# Patient Record
Sex: FEMALE | Race: WHITE | Employment: STUDENT | URBAN - METROPOLITAN AREA
[De-identification: names, ages, dates, MRNs, and addresses within clinical notes are randomized per-mention and may not be internally consistent; named-entity substitution may affect disease eponyms.]

---

## 2018-02-10 ENCOUNTER — OFFICE VISIT (OUTPATIENT)
Dept: FAMILY MEDICINE CLINIC | Facility: CLINIC | Age: 23
End: 2018-02-10
Payer: COMMERCIAL

## 2018-02-10 VITALS
HEIGHT: 61 IN | BODY MASS INDEX: 25.49 KG/M2 | DIASTOLIC BLOOD PRESSURE: 70 MMHG | SYSTOLIC BLOOD PRESSURE: 110 MMHG | WEIGHT: 135 LBS | TEMPERATURE: 99.4 F

## 2018-02-10 DIAGNOSIS — J11.1 INFLUENZA: Primary | ICD-10-CM

## 2018-02-10 PROCEDURE — 99214 OFFICE O/P EST MOD 30 MIN: CPT | Performed by: FAMILY MEDICINE

## 2018-02-10 PROCEDURE — 3008F BODY MASS INDEX DOCD: CPT | Performed by: FAMILY MEDICINE

## 2018-02-10 RX ORDER — DEXTROMETHORPHAN HYDROBROMIDE AND PROMETHAZINE HYDROCHLORIDE 15; 6.25 MG/5ML; MG/5ML
5 SYRUP ORAL 4 TIMES DAILY PRN
Qty: 118 ML | Refills: 0 | Status: SHIPPED | OUTPATIENT
Start: 2018-02-10 | End: 2019-12-02

## 2018-02-10 RX ORDER — CLINDAMYCIN PHOSPHATE AND BENZOYL PEROXIDE 10; 50 MG/G; MG/G
GEL TOPICAL
Refills: 0 | COMMUNITY
Start: 2018-01-18 | End: 2019-06-11

## 2018-02-10 RX ORDER — OSELTAMIVIR PHOSPHATE 75 MG/1
75 CAPSULE ORAL EVERY 12 HOURS SCHEDULED
Qty: 10 CAPSULE | Refills: 0 | Status: SHIPPED | OUTPATIENT
Start: 2018-02-10 | End: 2018-02-15

## 2018-02-10 NOTE — PROGRESS NOTES
Assessment/Plan:  Influenza    The patient is a 25-year-old female who presents today with a respiratory illness which includes sore throat cough headache myalgias and low-grade fever  I believe this most likely represents influenza  I do not believe it represents a strep throat but we are going to get a culture to rule this out  We are going to start her on Tamiflu as well as promethazine DM and have her push fluids and rest   She will stay out of work and school until Monday  She will call Monday for a report and for results of her throat culture  She will call sooner or seek more urgent medical attention as needed  She agrees  Diagnoses and all orders for this visit:    Influenza  -     oseltamivir (TAMIFLU) 75 mg capsule; Take 1 capsule (75 mg total) by mouth every 12 (twelve) hours for 5 days  -     promethazine-dextromethorphan (PHENERGAN-DM) 6 25-15 mg/5 mL oral syrup; Take 5 mL by mouth 4 (four) times a day as needed for cough  -     Throat culture    Other orders  -     Clindamycin Phos-Benzoyl Perox gel; APPLY TO ACNE ON WHOLE FACE AND SHOULDERS ONCE DAILY AT BEDTIME  KEEP REFRIGERATED IN BETWEEN USE  Subjective:   Chief Complaint   Patient presents with    Sore Throat     X 4 Days    Cough   I have a bad ST  No adenopathy  I feel achy and fatigued  Began 3 days ago  HA without N/V/D  No rash, irritative urinary sx  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  No fever  Has a bad cough  Sputum is clear, no hemoptysis  Patient ID: Mark Ontiveros is a 25 y o  female  HPI  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 who states that on Wednesday evening she began with a sore throat and a cough  She felt achy and fatigued  She has a headache as well  She has had no nausea vomiting or diarrhea  She has had no rash no irritative urinary symptoms and she has been aware of no fever  Sputum is clear without hemoptysis or purulence  She is not aware of any adenopathy    She does work around children and attend school on a regular basis  She has had no influenza vaccine this year  The following portions of the patient's history were reviewed and updated as appropriate: allergies, current medications, past family history, past medical history, past social history, past surgical history and problem list     Review of Systems   Constitution: Positive for chills and malaise/fatigue  Negative for decreased appetite, fever and night sweats  HENT: Positive for congestion and sore throat  Cardiovascular: Negative for chest pain  Respiratory: Positive for cough and sputum production  Negative for hemoptysis, shortness of breath and wheezing  Skin: Negative for rash  Gastrointestinal: Negative for diarrhea, nausea and vomiting  Genitourinary: Negative for bladder incontinence, dysuria and frequency  Objective:    Physical Exam   Constitutional: She is oriented to person, place, and time  She appears well-developed and well-nourished  Somewhat fatigued-appearing   Eyes: Conjunctivae are normal  Pupils are equal, round, and reactive to light  Right eye exhibits no discharge  Left eye exhibits no discharge  Scleral icterus is present  Neck: No thyromegaly present  Cardiovascular: Normal rate, regular rhythm and normal heart sounds  Pulmonary/Chest: Effort normal and breath sounds normal  She has no wheezes  She has no rales  Musculoskeletal: She exhibits no edema  Lymphadenopathy:     She has no cervical adenopathy  Neurological: She is alert and oriented to person, place, and time  Skin: Skin is warm and dry  No erythema  Psychiatric: She has a normal mood and affect

## 2018-02-10 NOTE — ASSESSMENT & PLAN NOTE
The patient is a 52-year-old female who presents today with a respiratory illness which includes sore throat cough headache myalgias and low-grade fever  I believe this most likely represents influenza  I do not believe it represents a strep throat but we are going to get a culture to rule this out  We are going to start her on Tamiflu as well as promethazine DM and have her push fluids and rest   She will stay out of work and school until Monday  She will call Monday for a report and for results of her throat culture  She will call sooner or seek more urgent medical attention as needed  She agrees

## 2018-02-10 NOTE — PATIENT INSTRUCTIONS
Push fluids rest use Tamiflu and promethazine Dm  Use antipyretics as needed  Call Monday with report

## 2018-08-21 LAB
EXTERNAL CHLAMYDIA RESULT: NEGATIVE
N GONORRHOEA RRNA SPEC QL PROBE: NEGATIVE

## 2018-11-12 ENCOUNTER — HOSPITAL ENCOUNTER (EMERGENCY)
Facility: HOSPITAL | Age: 23
Discharge: HOME | End: 2018-11-12
Attending: EMERGENCY MEDICINE | Admitting: EMERGENCY MEDICINE
Payer: COMMERCIAL

## 2018-11-12 VITALS
DIASTOLIC BLOOD PRESSURE: 84 MMHG | SYSTOLIC BLOOD PRESSURE: 144 MMHG | HEART RATE: 101 BPM | RESPIRATION RATE: 16 BRPM | OXYGEN SATURATION: 100 % | TEMPERATURE: 98.5 F
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
## 2018-11-12 DIAGNOSIS — S61.052A CAT BITE OF LEFT THUMB, INITIAL ENCOUNTER: Primary | ICD-10-CM

## 2018-11-12 PROCEDURE — 99283 EMERGENCY DEPT VISIT LOW MDM: CPT

## 2018-11-12 RX ORDER — AMOXICILLIN AND CLAVULANATE POTASSIUM 875; 125 MG/1; MG/1
1 TABLET, FILM COATED ORAL 2 TIMES DAILY
Qty: 14 TABLET | Refills: 0 | Status: SHIPPED | OUTPATIENT
Start: 2018-11-12 | End: 2018-11-19

## 2018-11-12 RX ORDER — AMOXICILLIN AND CLAVULANATE POTASSIUM 875; 125 MG/1; MG/1
1 TABLET, FILM COATED ORAL ONCE
Status: COMPLETED | OUTPATIENT
Start: 2018-11-12 | End: 2018-11-12

## 2018-11-12 RX ADMIN — AMOXICILLIN AND CLAVULANATE POTASSIUM 1 TABLET: 875; 125 TABLET, FILM COATED ORAL at 22:52

## 2018-11-13 NOTE — DISCHARGE INSTRUCTIONS
Animal Bite   WHAT YOU NEED TO KNOW:   Animal bite injuries range from shallow cuts to deep, life-threatening wounds  An animal can cut or puncture the skin when it bites  Your skin may be torn from your body  Your skin may swell or bruise even if the bite does not break the skin  Animal bites occur more often on the hands, arms, legs, and face  Bites from dogs and cats are the most common injuries  DISCHARGE INSTRUCTIONS:   Return to the emergency department if:   · You have a fever  · Your wound is red, swollen, and draining pus  · You see red streaks on the skin around the wound  · You can no longer move the bitten area  · Your heartbeat and breathing are much faster than usual     · You feel dizzy and confused  Contact your healthcare provider if:   · Your pain does not get better, even after you take pain medicine  · You have nightmares or flashbacks about the animal bite  · You have questions or concerns about your condition or care  Medicines: You may need any of the following:  · Antibiotics  prevent or treat a bacterial infection  · Prescription pain medicine  may be given  Ask how to take this medicine safely  · A tetanus vaccine  may be needed to prevent tetanus  Tetanus is a life-threatening bacterial infection that affects the nerves and muscles  The bacteria can be spread through animal bites  · A rabies vaccine  may be needed to prevent rabies  Rabies is a life-threatening viral infection  The virus can be spread through animal bites  · Take your medicine as directed  Contact your healthcare provider if you think your medicine is not helping or if you have side effects  Tell him of her if you are allergic to any medicine  Keep a list of the medicines, vitamins, and herbs you take  Include the amounts, and when and why you take them  Bring the list or the pill bottles to follow-up visits  Carry your medicine list with you in case of an emergency    Follow up with your healthcare provider in 1 to 2 days: You may need to return to have your stitches removed  Write down your questions so you remember to ask them during your visits  Self-care:   · Apply antibiotic ointment as directed  This helps prevent infection in minor skin wounds  It is available without a doctor's order  · Keep the wound clean and covered  Wash the wound every day with soap and water or germ-killing cleanser  Ask your healthcare provider about the kinds of bandages to use  · Apply ice on your wound  Ice helps decrease swelling and pain  Ice may also help prevent tissue damage  Use an ice pack, or put crushed ice in a plastic bag  Cover it with a towel and place it on your wound for 15 to 20 minutes every hour or as directed  · Elevate the wound area  Raise your wound above the level of your heart as often as you can  This will help decrease swelling and pain  Prop your wound on pillows or blankets to keep it elevated comfortably  Prevent another animal bite:   · Learn to recognize the signs of a scared or angry pet  Avoid quick, sudden movements  · Do not step between animals that are fighting  · Do not leave a pet alone with a young child  · Do not disturb an animal while it eats, sleeps, or cares for its young  · Do not approach an animal you do not know, especially one that is tied up or caged  · Stay away from animals that seem sick or act strangely  · Do not feed or capture wild animals  © 2017 [address] Information is for End User's use only and may not be sold, redistributed or otherwise used for commercial purposes  All illustrations and images included in CareNotes® are the copyrighted property of [employer] , [employer]  or Rodney Hernandez  The above information is an  only  It is not intended as medical advice for individual conditions or treatments   Talk to your doctor, nurse or pharmacist before following any medical regimen to see if it is safe and effective for you

## 2018-11-13 NOTE — ED PROVIDER NOTES
History  Chief Complaint   Patient presents with    Cat Bite     bitten by cat on left thumb two hours ago  Cat not up to date on vaccines  History provided by:  Patient and parent  Cat Bite   Contact animal:  Cat  Location:  Finger  Finger injury location:  L thumb  Time since incident:  4 hours  Pain details:     Quality: Initially was mildly achy but pain resolved after a few minutes now currently no pain  Severity:  No pain    Timing:  Unable to specify    Progression:  Resolved  Incident location:  Work  Provoked: provoked    Notification: The patient works as a , the CT was you thin eyes, was decapitate and head/brain was sent for analysis for rabies  Animal's rabies vaccination status:  Out of date  Animal in possession: yes    Tetanus status:  Up to date  Relieved by:  None tried  Worsened by:  Nothing  Ineffective treatments:  None tried  Associated symptoms: no fever, no rash and no swelling        Prior to Admission Medications   Prescriptions Last Dose Informant Patient Reported? Taking? Clindamycin Phos-Benzoyl Perox gel   Yes No   Sig: APPLY TO ACNE ON WHOLE FACE AND SHOULDERS ONCE DAILY AT BEDTIME  KEEP REFRIGERATED IN BETWEEN USE    promethazine-dextromethorphan (PHENERGAN-DM) 6 25-15 mg/5 mL oral syrup   No No   Sig: Take 5 mL by mouth 4 (four) times a day as needed for cough      Facility-Administered Medications: None       Past Medical History:   Diagnosis Date    Pityriasis rosea 3/24/2015       Past Surgical History:   Procedure Laterality Date    WISDOM TOOTH EXTRACTION         Family History   Problem Relation Age of Onset    Breast cancer Mother     Breast cancer Paternal Grandmother      I have reviewed and agree with the history as documented  Social History   Substance Use Topics    Smoking status: Never Smoker    Smokeless tobacco: Never Used    Alcohol use No        Review of Systems   Constitutional: Negative for fever     Respiratory: Negative for chest tightness and shortness of breath  Cardiovascular: Negative for chest pain  Skin: Negative for rash  Neurological: Negative for dizziness, light-headedness and headaches  Physical Exam  Physical Exam   Constitutional: She appears well-developed  HENT:   Head: Atraumatic  Eyes: Conjunctivae are normal  Right eye exhibits no discharge  Left eye exhibits no discharge  No scleral icterus  Neck: Neck supple  No tracheal deviation present  Pulmonary/Chest: Effort normal  No stridor  No respiratory distress  Musculoskeletal: She exhibits no deformity  Neurological: She is alert  She exhibits normal muscle tone  Coordination normal    Skin: Skin is warm and dry  No rash noted  No erythema  No pallor  Small puncture wound over left thumb, just proximal to MCP, no joint involvement full range of motion of the hand without any discomfort  Psychiatric: She has a normal mood and affect  Vitals reviewed  Vital Signs  ED Triage Vitals [11/12/18 2234]   Temperature Pulse Respirations Blood Pressure SpO2   98 5 °F (36 9 °C) 101 16 144/84 100 %      Temp Source Heart Rate Source Patient Position - Orthostatic VS BP Location FiO2 (%)   Oral Monitor Sitting Right arm --      Pain Score       --           Vitals:    11/12/18 2234   BP: 144/84   Pulse: 101   Patient Position - Orthostatic VS: Sitting       Visual Acuity      ED Medications  Medications   amoxicillin-clavulanate (AUGMENTIN) 875-125 mg per tablet 1 tablet (1 tablet Oral Given 11/12/18 2252)       Diagnostic Studies  Results Reviewed     None                 No orders to display              Procedures  Procedures       Phone Contacts  ED Phone Contact    ED Course                               MDM  Number of Diagnoses or Management Options  Cat bite of left thumb, initial encounter: new and requires workup  Diagnosis management comments: Cat bite, Cats brain was sent to appropriate authorities for analysis for rabies    We had a very long conversation about to vaccinated against rabies verses to hold off  Patient wants to hold off at this time  She agrees that she does not have result back by Friday she will return for rabies series  No signs of infection at this time but as the bite just occurred this is not surprising  Will place on prophylactic Augmentin  Patient understands and agrees to the discharge plan       Amount and/or Complexity of Data Reviewed  Decide to obtain previous medical records or to obtain history from someone other than the patient: yes  Obtain history from someone other than the patient: yes  Review and summarize past medical records: yes      CritCare Time    Disposition  Final diagnoses:   Cat bite of left thumb, initial encounter     Time reflects when diagnosis was documented in both MDM as applicable and the Disposition within this note     Time User Action Codes Description Comment    11/12/2018 10:45 PM Angelica, [address] [facility],  [id number]] Cat bite of left thumb, initial encounter       ED Disposition     ED Disposition Condition Comment    Discharge  Werner Guerra discharge to home/self care  Condition at discharge: Good        Follow-up Information     Follow up With Specialties Details Why Contact Info Additional [address] Emergency Department Emergency Medicine Go to If you have not heard back about rabies status on the cat that bit you by Friday  Or immediately if you develop any signs or symptoms of serious infections such as redness streaking up the arm or any fevers   [address] [facility] [address] [phone] [facility] ED, [address], [place], [place], [address]          Patient's Medications   Discharge Prescriptions    AMOXICILLIN-CLAVULANATE (AUGMENTIN) 875-125 MG PER TABLET    Take 1 tablet by mouth 2 (two) times a day for 7 days       Start Date: 11/12/2018End Date: 11/19/2018       Order Dose: 1 tablet Quantity: 14 tablet    Refills: 0     No discharge procedures on file      ED Provider  Electronically Signed by           Liyah Pitts DO  11/12/18 8752

## 2019-06-11 ENCOUNTER — OFFICE VISIT (OUTPATIENT)
Dept: URGENT CARE | Facility: CLINIC | Age: 24
End: 2019-06-11
Payer: COMMERCIAL

## 2019-06-11 VITALS
TEMPERATURE: 98.5 F | BODY MASS INDEX: 24.92 KG/M2 | OXYGEN SATURATION: 98 % | DIASTOLIC BLOOD PRESSURE: 60 MMHG | HEART RATE: 89 BPM | HEIGHT: 61 IN | RESPIRATION RATE: 16 BRPM | WEIGHT: 132 LBS | SYSTOLIC BLOOD PRESSURE: 120 MMHG

## 2019-06-11 DIAGNOSIS — R30.0 DYSURIA: Primary | ICD-10-CM

## 2019-06-11 LAB
SL AMB  POCT GLUCOSE, UA: ABNORMAL
SL AMB LEUKOCYTE ESTERASE,UA: ABNORMAL
SL AMB POCT BILIRUBIN,UA: ABNORMAL
SL AMB POCT BLOOD,UA: ABNORMAL
SL AMB POCT CLARITY,UA: ABNORMAL
SL AMB POCT COLOR,UA: YELLOW
SL AMB POCT KETONES,UA: ABNORMAL
SL AMB POCT NITRITE,UA: ABNORMAL
SL AMB POCT PH,UA: 7.5
SL AMB POCT SPECIFIC GRAVITY,UA: 1.01
SL AMB POCT URINE PROTEIN: ABNORMAL
SL AMB POCT UROBILINOGEN: 0.2

## 2019-06-11 PROCEDURE — 87147 CULTURE TYPE IMMUNOLOGIC: CPT | Performed by: NURSE PRACTITIONER

## 2019-06-11 PROCEDURE — 87086 URINE CULTURE/COLONY COUNT: CPT | Performed by: NURSE PRACTITIONER

## 2019-06-11 PROCEDURE — 87077 CULTURE AEROBIC IDENTIFY: CPT | Performed by: NURSE PRACTITIONER

## 2019-06-11 PROCEDURE — G0382 LEV 3 HOSP TYPE B ED VISIT: HCPCS | Performed by: NURSE PRACTITIONER

## 2019-06-11 PROCEDURE — 81002 URINALYSIS NONAUTO W/O SCOPE: CPT | Performed by: NURSE PRACTITIONER

## 2019-06-11 RX ORDER — CLINDAMYCIN AND BENZOYL PEROXIDE 10; 50 MG/G; MG/G
GEL TOPICAL
COMMUNITY
Start: 2019-06-06 | End: 2019-12-02

## 2019-06-11 RX ORDER — NITROFURANTOIN 25; 75 MG/1; MG/1
100 CAPSULE ORAL 2 TIMES DAILY
Qty: 10 CAPSULE | Refills: 0 | Status: SHIPPED | OUTPATIENT
Start: 2019-06-11 | End: 2019-12-02

## 2019-06-13 LAB — BACTERIA UR CULT: ABNORMAL

## 2019-08-09 ENCOUNTER — CLINICAL SUPPORT (OUTPATIENT)
Dept: FAMILY MEDICINE CLINIC | Facility: CLINIC | Age: 24
End: 2019-08-09
Payer: COMMERCIAL

## 2019-08-09 DIAGNOSIS — Z23 ENCOUNTER FOR IMMUNIZATION: Primary | ICD-10-CM

## 2019-08-09 PROCEDURE — 90715 TDAP VACCINE 7 YRS/> IM: CPT

## 2019-08-09 PROCEDURE — 90471 IMMUNIZATION ADMIN: CPT

## 2019-11-04 ENCOUNTER — OFFICE VISIT (OUTPATIENT)
Dept: FAMILY MEDICINE CLINIC | Facility: CLINIC | Age: 24
End: 2019-11-04
Payer: COMMERCIAL

## 2019-11-04 VITALS
WEIGHT: 140.5 LBS | OXYGEN SATURATION: 97 % | HEART RATE: 74 BPM | HEIGHT: 61 IN | DIASTOLIC BLOOD PRESSURE: 88 MMHG | SYSTOLIC BLOOD PRESSURE: 110 MMHG | TEMPERATURE: 98.7 F | BODY MASS INDEX: 26.53 KG/M2

## 2019-11-04 DIAGNOSIS — R51.9 ACUTE INTRACTABLE HEADACHE, UNSPECIFIED HEADACHE TYPE: ICD-10-CM

## 2019-11-04 DIAGNOSIS — R68.89 SUSPECTED LYME DISEASE: Primary | ICD-10-CM

## 2019-11-04 DIAGNOSIS — R53.83 OTHER FATIGUE: ICD-10-CM

## 2019-11-04 DIAGNOSIS — Z23 NEED FOR INFLUENZA VACCINATION: ICD-10-CM

## 2019-11-04 DIAGNOSIS — R59.1 LYMPHADENOPATHY: ICD-10-CM

## 2019-11-04 PROCEDURE — 3008F BODY MASS INDEX DOCD: CPT | Performed by: FAMILY MEDICINE

## 2019-11-04 PROCEDURE — 99214 OFFICE O/P EST MOD 30 MIN: CPT | Performed by: FAMILY MEDICINE

## 2019-11-04 RX ORDER — DOXYCYCLINE HYCLATE 100 MG/1
100 CAPSULE ORAL EVERY 12 HOURS SCHEDULED
Qty: 42 CAPSULE | Refills: 0 | Status: SHIPPED | OUTPATIENT
Start: 2019-11-04 | End: 2019-11-25

## 2019-11-04 RX ORDER — CLINDAMYCIN AND BENZOYL PEROXIDE 10; 50 MG/G; MG/G
GEL TOPICAL 2 TIMES DAILY
COMMUNITY
End: 2022-07-01 | Stop reason: ALTCHOICE

## 2019-11-04 NOTE — PROGRESS NOTES
Subjective:   Chief Complaint   Patient presents with    New Patient     Pt had been seen at Dr Kamron Calvo office over a year ago  Pt states her family members had problems at the office so she was not comfortable going there  Pt had PAP at Dr Ned Dandy in Sept 2019  Records will be requested  Pt defers flu shot   Swollen Glands     For the past week, pt has had headaches and she has been living on advil  Pt also goes to the chiropractor and she went on Friday  She had more pain and headaches since going on Friday  Pt also has a swollen lymph node in her neck  She also noticed some pain in her lower abd as well as fatigue  Patient ID: Dena Garcia is a 22 y o  female      The pt is here today because she has not been feeling well   She's had headaches for quite some time, 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  Figures these were more tension headaches  But much worse the past 1-2 weeks   No nausea  Not really light sensitive most of the time  Feels like a hammer is hitting her head  Can be in the front, on the side, different locations   - on the computer all day  Has glasses, rx is utd  She has not pain or pressure in her ears  She has had a little ringing in her ears the past day or 2  No sinus pain or pressure  She is not sick with cold symptoms at all  No fevers or chills  She has a painful lymph node just under the right ear  She has some lymph nodes in the groin as well  As stated above, she is a  and works with animals  Also she hikes and is outside a lot      The following portions of the patient's history were reviewed and updated as appropriate: allergies, current medications, past family history, past medical history, past social history, past surgical history and problem list     Review of Systems      Objective:  Vitals:    11/04/19 1417   BP: 110/88   Pulse: 74   Temp: 98 7 °F (37 1 °C)   SpO2: 97%   Weight: 63 7 kg (140 lb 8 oz)   Height: 5' 1" (1 549 m)      Physical Exam   Constitutional: She is oriented to person, place, and time  Vital signs are normal  She appears well-developed and well-nourished  She does not appear ill  No distress  HENT:   Head: Normocephalic and atraumatic  Right Ear: Tympanic membrane, external ear and ear canal normal    Left Ear: Tympanic membrane, external ear and ear canal normal    Nose: No rhinorrhea  Right sinus exhibits no maxillary sinus tenderness and no frontal sinus tenderness  Left sinus exhibits no maxillary sinus tenderness and no frontal sinus tenderness  Mouth/Throat: Mucous membranes are normal  No oropharyngeal exudate, posterior oropharyngeal edema or posterior oropharyngeal erythema  Eyes: Conjunctivae, EOM and lids are normal    Pulmonary/Chest: Effort normal  No respiratory distress  Lymphadenopathy:     She has no cervical adenopathy  Neurological: She is alert and oriented to person, place, and time  No cranial nerve deficit  Coordination normal    Skin: Skin is warm, dry and intact  No rash noted  She is not diaphoretic  No erythema  Psychiatric: She has a normal mood and affect  Her behavior is normal  Judgment and thought content normal        Diabetic Foot Exam      Assessment/Plan:    No problem-specific Assessment & Plan notes found for this encounter  Diagnoses and all orders for this visit:    Suspected Lyme disease  -     doxycycline hyclate (VIBRAMYCIN) 100 mg capsule; Take 1 capsule (100 mg total) by mouth every 12 (twelve) hours for 21 days  -     Lyme Antibody Profile with reflex to WB    I think we should go ahead and treat for suspected Lyme disease  I advised the Lyme test may very well be negative but this could certainly be Lyme    If she does not feel better after a week of the antibiotics then we will need to look into other things assuming all of the rest of the labs are normal   I am going to draw a TSH, CBC, and CMP in addition to the Lyme  Acute intractable headache, unspecified headache type  -     CBC and differential  -     Comprehensive metabolic panel    Lymphadenopathy    Other fatigue  -     TSH, 3rd generation with Free T4 reflex  -     CBC and differential  -     Comprehensive metabolic panel  -     Lyme Antibody Profile with reflex to WB    BMI 26 0-26 9,adult    Need for influenza vaccination  -     influenza vaccine, [id number], quadrivalent, 0 5 mL, preservative-free, for adult and pediatric patients 6 mos+ (AFLURIA, FLUARIX, FLULAVAL, FLUZONE)    Other orders  -     Dapsone (ACZONE EX); Apply topically  -     clindamycin-benzoyl peroxide (BENZACLIN) gel; Apply topically 2 (two) times a day      BMI Counseling: Body mass index is 26 55 kg/m²  The BMI is above normal  Exercise recommendations include exercising 3-5 times per week

## 2019-11-04 NOTE — PATIENT INSTRUCTIONS
Lyme Disease   AMBULATORY CARE:   Lyme disease  is a bacterial infection caused by the bite of an infected tick  Common symptoms include the following:   · A red rash that looks like a target or bull's eye    · Fever, chills, or sore throat    · Weakness and tiredness    · Headache or muscle aches    · Joint pain    · Abdominal pain, nausea, or diarrhea  Call 911 for any of the following:   · Your heart is beating faster than usual and you feel dizzy  · You have chest pain or trouble breathing  · You suddenly cannot talk or see well, or you have trouble moving an area of your body  Seek care immediately if:   · You have a headache and a stiff neck  · You have trouble concentrating or thinking clearly  · You have numbness or tingling in your arms or legs, or you have trouble walking  Contact your healthcare provider if:   · Your rash grows or spreads to other areas of your body  · You suddenly have trouble falling or staying asleep  · You have new or worsening pain and swelling in your joints  · You have new or worsening weakness and muscle pain  · You have a new tick bite  · You have questions or concerns about your condition or care  Treatment for Lyme disease  may include any of the following:  · Antibiotics  treat a bacterial infection  · NSAIDs , such as ibuprofen, help decrease swelling, pain, and fever  This medicine is available with or without a doctor's order  NSAIDs can cause stomach bleeding or kidney problems in certain people  If you take blood thinner medicine, always ask your healthcare provider if NSAIDs are safe for you  Always read the medicine label and follow directions  Follow up with your healthcare provider as directed:  Write down your questions so you remember to ask them during your visits  Prevent a tick bite:  Ticks live in areas covered by brush and grass  They may even be found in your lawn if you live in certain areas   Outdoor pets can carry ticks inside the house  Ticks can grab onto you or your clothes when you walk by grass or brush  If you go into areas that contain many trees, tall grasses, and underbrush, do the following:  · Wear light colored pants and a long-sleeved shirt  Tuck your pants into your socks or boots  Tuck in your shirt  Wear sleeves that fit close to the skin at your wrists and neck  This will help prevent ticks from crawling through gaps in your clothing and onto your skin  Wear a hat in areas with trees  · Apply insect repellant on your skin  The insect repellant should contain DEET  Do not put insect repellant on skin that is cut, scratched, or irritated  Always use soap and water to wash the insect repellant off as soon as possible once you are indoors  Do not apply insect repellant on your child's face or hands  · Spray insect repellant onto your clothes  Use permethrin spray  This spray kills ticks that crawl on your clothing  Be sure to spray the tops of your boots, bottom of pant legs, and sleeve cuffs  As soon as possible, wash and dry clothing in hot water and high heat  · Check your and your child's clothing, hair, and skin for ticks  Shower within 2 hours of coming indoors  Carefully check the hairline, armpits, neck, and waist      · Decrease the risk for ticks in your yard  Ticks like to live in shady, moist areas  Thoreau Baker your lawn regularly to keep the grass short  Trim the grass around birdbaths and fences  Cut branches that are overgrown and take them out of the yard  Clear out leaf piles  Tracy Velha firewood in a dry, mary area  · Treat pets with tick control products  as directed  This will decrease your risk for a tick bite  Check your pets for ticks  Remove ticks from pets the same way as you remove them from people  Ask your pet's  about the best product to use on your pet  · Remove a tick with tweezers  Wear gloves  Grasp the tick as close to your skin as possible   Pull the tick straight up and out  Do not touch the tick with your bare hands  Check to make sure you removed the whole tick, including the head  Clean the area with soap and water or rubbing alcohol  Then wash your hands with soap and water  For more information:   · Centers for Disease Control and Prevention (CDC)  [address] , [address]  Phone: 7- 616 - [phone]  Web Address: Sabrina ibarra  © 2017 [address] [facility] Information is for End User's use only and may not be sold, redistributed or otherwise used for commercial purposes  All illustrations and images included in CareNotes® are the copyrighted property of A [employer]  or Rodney Hernandez  The above information is an  only  It is not intended as medical advice for individual conditions or treatments  Talk to your doctor, nurse or pharmacist before following any medical regimen to see if it is safe and effective for you

## 2019-11-05 LAB
ALBUMIN SERPL-MCNC: 4.8 G/DL (ref 3.6–5.1)
ALBUMIN/GLOB SERPL: 1.7 (CALC) (ref 1–2.5)
ALP SERPL-CCNC: 44 U/L (ref 33–115)
ALT SERPL-CCNC: 14 U/L (ref 6–29)
AST SERPL-CCNC: 14 U/L (ref 10–30)
B BURGDOR AB SER IA-ACNC: <0.9 INDEX
BASOPHILS # BLD AUTO: 27 CELLS/UL (ref 0–200)
BASOPHILS NFR BLD AUTO: 0.3 %
BILIRUB SERPL-MCNC: 0.5 MG/DL (ref 0.2–1.2)
BUN SERPL-MCNC: 17 MG/DL (ref 7–25)
BUN/CREAT SERPL: 12 (CALC) (ref 6–22)
CALCIUM SERPL-MCNC: 10.1 MG/DL (ref 8.6–10.2)
CHLORIDE SERPL-SCNC: 104 MMOL/L (ref 98–110)
CO2 SERPL-SCNC: 25 MMOL/L (ref 20–32)
CREAT SERPL-MCNC: 1.41 MG/DL (ref 0.5–1.1)
EOSINOPHIL # BLD AUTO: 54 CELLS/UL (ref 15–500)
EOSINOPHIL NFR BLD AUTO: 0.6 %
ERYTHROCYTE [DISTWIDTH] IN BLOOD BY AUTOMATED COUNT: 12 % (ref 11–15)
GLOBULIN SER CALC-MCNC: 2.8 G/DL (CALC) (ref 1.9–3.7)
GLUCOSE SERPL-MCNC: 79 MG/DL (ref 65–99)
HCT VFR BLD AUTO: 43.1 % (ref 35–45)
HGB BLD-MCNC: 14 G/DL (ref 11.7–15.5)
LYMPHOCYTES # BLD AUTO: 2430 CELLS/UL (ref 850–3900)
LYMPHOCYTES NFR BLD AUTO: 27 %
MCH RBC QN AUTO: 27.1 PG (ref 27–33)
MCHC RBC AUTO-ENTMCNC: 32.5 G/DL (ref 32–36)
MCV RBC AUTO: 83.4 FL (ref 80–100)
MONOCYTES # BLD AUTO: 450 CELLS/UL (ref 200–950)
MONOCYTES NFR BLD AUTO: 5 %
NEUTROPHILS # BLD AUTO: 6039 CELLS/UL (ref 1500–7800)
NEUTROPHILS NFR BLD AUTO: 67.1 %
PLATELET # BLD AUTO: 339 THOUSAND/UL (ref 140–400)
PMV BLD REES-ECKER: 9.3 FL (ref 7.5–12.5)
POTASSIUM SERPL-SCNC: 3.8 MMOL/L (ref 3.5–5.3)
PROT SERPL-MCNC: 7.6 G/DL (ref 6.1–8.1)
RBC # BLD AUTO: 5.17 MILLION/UL (ref 3.8–5.1)
SL AMB EGFR AFRICAN AMERICAN: 61 ML/MIN/1.73M2
SL AMB EGFR NON AFRICAN AMERICAN: 52 ML/MIN/1.73M2
SODIUM SERPL-SCNC: 140 MMOL/L (ref 135–146)
TSH SERPL-ACNC: 1.6 MIU/L
WBC # BLD AUTO: 9 THOUSAND/UL (ref 3.8–10.8)

## 2019-11-11 ENCOUNTER — OFFICE VISIT (OUTPATIENT)
Dept: FAMILY MEDICINE CLINIC | Facility: CLINIC | Age: 24
End: 2019-11-11
Payer: COMMERCIAL

## 2019-11-11 VITALS
SYSTOLIC BLOOD PRESSURE: 110 MMHG | TEMPERATURE: 98.8 F | BODY MASS INDEX: 26.71 KG/M2 | DIASTOLIC BLOOD PRESSURE: 80 MMHG | OXYGEN SATURATION: 98 % | HEART RATE: 91 BPM | WEIGHT: 141.5 LBS | HEIGHT: 61 IN

## 2019-11-11 DIAGNOSIS — R68.89 SUSPECTED LYME DISEASE: ICD-10-CM

## 2019-11-11 DIAGNOSIS — R79.89 ELEVATED SERUM CREATININE: Primary | ICD-10-CM

## 2019-11-11 PROBLEM — J11.1 INFLUENZA: Status: RESOLVED | Noted: 2018-02-10 | Resolved: 2019-11-11

## 2019-11-11 PROCEDURE — 99213 OFFICE O/P EST LOW 20 MIN: CPT | Performed by: FAMILY MEDICINE

## 2019-11-11 PROCEDURE — 1036F TOBACCO NON-USER: CPT | Performed by: FAMILY MEDICINE

## 2019-11-11 NOTE — PATIENT INSTRUCTIONS
Impaired Kidney Function   WHAT YOU NEED TO KNOW:   What do I need to know about impaired kidney function? Impaired kidney function is when your kidneys are not working as well as they should  Normally, kidneys remove fluid, chemicals, and waste from your blood  These wastes are removed from your body in the urine made by your kidneys  If impaired kidney function is not treated or gets worse, it may lead to long-term kidney disease or kidney failure  How can I manage impaired kidney function? It is important to follow up with your healthcare provider as directed  The following may also improve your kidney function:  · Manage other health conditions  such as diabetes, high blood pressure, or heart disease  These conditions stress your kidneys  · Talk to your healthcare provider before you take over-the-counter-medicine  NSAIDs, stomach medicine, or laxatives may harm your kidneys  · Limit alcohol  Ask how much alcohol is safe for you to drink  A drink of alcohol is 12 ounces of beer, 5 ounces of wine, or 1½ ounces of liquor  · Do not smoke  Nicotine can damage blood vessels and make it more difficult to manage your impaired kidney function  Smoking also harms your kidneys  Do not use e-cigarettes or smokeless tobacco in place of cigarettes or to help you quit  They still contain nicotine  Ask your healthcare provider for information if you currently smoke and need help quitting  When should I seek immediate care? · You have fluid buildup in your legs  · You have trouble breathing  · You urinate less than you normally do  · You have dark colored urine  When should I contact my healthcare provider? · You have a fever  · You have abdominal or low back pain  · Your skin is itchy or you have a rash  · You have nausea, vomit repeatedly, or have severe diarrhea  · You have fatigue or muscle weakness  · You have hiccups that will not stop      · You have questions or concerns about your condition or care  CARE AGREEMENT:   You have the right to help plan your care  Learn about your health condition and how it may be treated  Discuss treatment options with your caregivers to decide what care you want to receive  You always have the right to refuse treatment  The above information is an  only  It is not intended as medical advice for individual conditions or treatments  Talk to your doctor, nurse or pharmacist before following any medical regimen to see if it is safe and effective for you  © 2017 [facility] Information is for End User's use only and may not be sold, redistributed or otherwise used for commercial purposes  All illustrations and images included in CareNotes® are the copyrighted property of A D A M [employer]  or Rodney Hernandez

## 2019-11-11 NOTE — PROGRESS NOTES
Subjective:   Chief Complaint   Patient presents with    Results     pt here today for her fbw results from 11/4/19  Pt defers flu shot  Patient ID: Kamron Gamez is a 22 y o  female  The patient is here today to follow-up on her recent lab results  She came to see me as a new patient last week  She had clinical symptoms of Lyme and I treated her with doxycycline for suspected Lyme  I also got all panel of lab work  The Lyme was negative but she has only had symptoms for a couple of weeks so this generally makes sense  Her symptoms have gotten a little bit better on the doxycycline  She no longer has any headaches which she was having daily  The lymph node in the back of her neck has gone down as well  She still does feel achy at times but definitely not as bad as initially  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  She did not have a known tick bite   The only abnormality in her lab work was an elevated creatinine and decreased EGFR  We called her with the results and advised that I wanted to follow-up on this in six weeks  She and her mom were nervous so they made an appointment to come in and talk about it today  The only history of any kidney disease in the family that they are aware of her kidney stones  She has no pain      The following portions of the patient's history were reviewed and updated as appropriate: allergies, current medications, past family history, past medical history, past social history, past surgical history and problem list     Review of Systems            Objective:  Vitals:    11/11/19 0850   BP: 110/80   Pulse: 91   Temp: 98 8 °F (37 1 °C)   SpO2: 98%   Weight: 64 2 kg (141 lb 8 oz)   Height: 5' 1" (1 549 m)      Physical Exam   Constitutional: She is oriented to person, place, and time  She appears well-developed and well-nourished  No distress  Abdominal: There is no CVA tenderness     Neurological: She is alert and oriented to person, place, and time  No cranial nerve deficit  Coordination normal    Skin: Skin is warm and dry  No rash noted  She is not diaphoretic  No erythema  Psychiatric: She has a normal mood and affect  Her behavior is normal  Judgment and thought content normal          Assessment/Plan:    Elevated serum creatinine  The patient's kidney function was significantly abnormal, especially for her age  There is no known family history of kidney disease, especially at a young age  The rest of her lab work was normal   I wonder if this is related to what is going on with how she is feeling, possibly related to Lyme disease though we do not have anything other than a clinical diagnosis of Lyme  For now she is going to continue her doxycycline and finish out the three weeks of treatment  I am going to have her get a repeat BMP in six weeks  I advised that she go nonfasting, that she eat and drink her normal amount prior to the labs  If at that point her kidney function is still abnormal I am going to refer her to Nephrology  Diagnoses and all orders for this visit:    Elevated serum creatinine  -     Basic metabolic panel; Future  -     Basic metabolic panel    Suspected Lyme disease  -     Lyme Antibody Profile with reflex to WB; Future  -     Lyme Antibody Profile with reflex to WB        More than half of this 25 minute visit spent counseling and coordinating care

## 2019-11-11 NOTE — ASSESSMENT & PLAN NOTE
The patient's kidney function was significantly abnormal, especially for her age  There is no known family history of kidney disease, especially at a young age  The rest of her lab work was normal   I wonder if this is related to what is going on with how she is feeling, possibly related to Lyme disease though we do not have anything other than a clinical diagnosis of Lyme  For now she is going to continue her doxycycline and finish out the three weeks of treatment  I am going to have her get a repeat BMP in six weeks  I advised that she go nonfasting, that she eat and drink her normal amount prior to the labs  If at that point her kidney function is still abnormal I am going to refer her to Nephrology

## 2019-12-02 ENCOUNTER — OFFICE VISIT (OUTPATIENT)
Dept: FAMILY MEDICINE CLINIC | Facility: CLINIC | Age: 24
End: 2019-12-02
Payer: COMMERCIAL

## 2019-12-02 VITALS
HEIGHT: 61 IN | HEART RATE: 86 BPM | WEIGHT: 140 LBS | OXYGEN SATURATION: 98 % | DIASTOLIC BLOOD PRESSURE: 76 MMHG | SYSTOLIC BLOOD PRESSURE: 110 MMHG | BODY MASS INDEX: 26.43 KG/M2 | TEMPERATURE: 99.2 F

## 2019-12-02 DIAGNOSIS — R68.89 SUSPECTED LYME DISEASE: ICD-10-CM

## 2019-12-02 DIAGNOSIS — R79.89 ELEVATED SERUM CREATININE: ICD-10-CM

## 2019-12-02 DIAGNOSIS — G44.221 CHRONIC TENSION-TYPE HEADACHE, INTRACTABLE: Primary | ICD-10-CM

## 2019-12-02 DIAGNOSIS — R53.83 OTHER FATIGUE: ICD-10-CM

## 2019-12-02 PROCEDURE — 99214 OFFICE O/P EST MOD 30 MIN: CPT | Performed by: FAMILY MEDICINE

## 2019-12-02 PROCEDURE — 3008F BODY MASS INDEX DOCD: CPT | Performed by: FAMILY MEDICINE

## 2019-12-02 NOTE — ASSESSMENT & PLAN NOTE
It has now been a month and the patient was going to have the labs repeated in two weeks but since she is here we will repeat them today  If they are not normal I will be doing some further testing including an ultrasound of her kidneys and referral to Nephrology
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
## 2019-12-02 NOTE — PATIENT INSTRUCTIONS
Tension Headache   WHAT YOU NEED TO KNOW:   Tension headaches are most often caused by stress, eye strain, or muscle tightness  The pain of a tension headache may start in the forehead or the back of the head  The pain often spreads over the whole head and down into the neck and shoulders  Over-the-counter pain medicine is the most useful and common treatment for a tension headache  Exercise, biofeedback, meditation, or relaxation techniques may also decrease your headache pain  DISCHARGE INSTRUCTIONS:   Return to the emergency department if:   · You have a sudden headache that seems different or much worse than your usual headaches  · You have difficulty seeing, speaking, or moving  · You pass out, become confused, or have a seizure  · You have a headache, fever, and a stiff neck  Contact your healthcare provider if:   · Your headaches continue to get worse  · Your headaches happen so often that they affect your ability to do your work or normal activities  · You need to take medicine to help your headaches more often than your healthcare provider says you should  · Your headaches get so bad that they cause you to vomit  · You have questions or concerns about your condition or care  Medicines:   · NSAIDs , such as ibuprofen, help decrease swelling, pain, and fever  This medicine is available with or without a doctor's order  NSAIDs can cause stomach bleeding or kidney problems in certain people  If you take blood thinner medicine, always ask your healthcare provider if NSAIDs are safe for you  Always read the medicine label and follow directions  · Acetaminophen  decreases pain and fever  It is available without a doctor's order  Ask how much to take and how often to take it  Follow directions  Read the labels of all other medicines you are using to see if they also contain acetaminophen, or ask your doctor or pharmacist  Acetaminophen can cause liver damage if not taken correctly   Do not use more than 4 grams (4,000 milligrams) total of acetaminophen in one day  · Take your medicine as directed  Contact your healthcare provider if you think your medicine is not helping or if you have side effects  Tell him of her if you are allergic to any medicine  Keep a list of the medicines, vitamins, and herbs you take  Include the amounts, and when and why you take them  Bring the list or the pill bottles to follow-up visits  Carry your medicine list with you in case of an emergency  Manage your symptoms:   · Keep a headache record  Include when the headaches start and stop and what made them better  Describe your symptoms, such as how the pain feels, where it is, and how bad it is  Record anything you ate or drank for the past 24 hours before your headache  Bring this to follow-up visits  · Apply heat as directed  Heat may help decrease headache pain and muscle spasms  Apply heat on the area for 20 to 30 minutes every 2 hours for as many days as directed  A warm bath may also help relieve muscle tension and spasms  · Apply ice as directed  Ice may help decrease headache pain  Use an ice pack or put crushed ice in a plastic bag  Cover it with a towel and place it on the area for 15 to 20 minutes every hour as directed  Prevent tension headaches:   · Avoid muscle tension  Do not stay in one position for long periods of time  Use a different pillow if you wake up with sore neck and shoulder muscles  Find ways to relax your muscles, such as massage or resting in a quiet, dark room  · Avoid eye strain  Make sure you have good lighting when you read, sew, or do similar activities  Get yearly eye exams and wear glasses as directed  · Get enough sleep  Get 8 to 10 hours of sleep each night  Create a sleep schedule  Go to bed and wake up at the same times each day  It may be helpful to do something relaxing before bed  Do not watch television right before bed      · Eat a variety of healthy foods  Healthy foods include fruits, vegetables, whole-grain breads, low-fat dairy products, beans, lean meats, and fish  Do not eat foods that trigger your headaches  · Exercise regularly  Exercise helps decrease stress and headaches  Ask about the best exercise plan for you  · Drink liquids as directed  You may need to drink more liquid to prevent dehydration  Dehydration can make a tension headache worse  Ask your healthcare provider how much liquid to drink and which liquids are best for you  Limit caffeine as directed  Caffeine may make a tension headache worse  · Do not drink alcohol  Alcohol can trigger a headache  It can also prevent medicines from stopping your headache  · Do not smoke  Nicotine and other chemicals in cigarettes and cigars can trigger a headache and also cause lung damage  Ask your healthcare provider for information if you currently smoke and need help to quit  E-cigarettes or smokeless tobacco still contain nicotine  Talk to your healthcare provider before you use these products  Follow up with your healthcare provider as directed:  Bring the headache record with you  Write down your questions so you remember to ask them during your visits  © 2017 [facility] Information is for End User's use only and may not be sold, redistributed or otherwise used for commercial purposes  All illustrations and images included in CareNotes® are the copyrighted property of A [employer] M [employer]  or Rodney Hernandez  The above information is an  only  It is not intended as medical advice for individual conditions or treatments  Talk to your doctor, nurse or pharmacist before following any medical regimen to see if it is safe and effective for you
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
## 2019-12-03 ENCOUNTER — EVALUATION (OUTPATIENT)
Dept: PHYSICAL THERAPY | Facility: CLINIC | Age: 24
End: 2019-12-03
Payer: COMMERCIAL

## 2019-12-03 DIAGNOSIS — G44.221 CHRONIC TENSION-TYPE HEADACHE, INTRACTABLE: ICD-10-CM

## 2019-12-03 LAB
B BURGDOR AB SER IA-ACNC: <0.9 INDEX
BUN SERPL-MCNC: 18 MG/DL (ref 7–25)
BUN/CREAT SERPL: NORMAL (CALC) (ref 6–22)
CALCIUM SERPL-MCNC: 9.7 MG/DL (ref 8.6–10.2)
CHLORIDE SERPL-SCNC: 104 MMOL/L (ref 98–110)
CO2 SERPL-SCNC: 28 MMOL/L (ref 20–32)
CREAT SERPL-MCNC: 0.69 MG/DL (ref 0.5–1.1)
GLUCOSE SERPL-MCNC: 85 MG/DL (ref 65–99)
POTASSIUM SERPL-SCNC: 4 MMOL/L (ref 3.5–5.3)
SL AMB EGFR AFRICAN AMERICAN: 142 ML/MIN/1.73M2
SL AMB EGFR NON AFRICAN AMERICAN: 123 ML/MIN/1.73M2
SODIUM SERPL-SCNC: 140 MMOL/L (ref 135–146)

## 2019-12-03 PROCEDURE — 97161 PT EVAL LOW COMPLEX 20 MIN: CPT

## 2019-12-03 NOTE — PROGRESS NOTES
PT Evaluation     Today's date: 12/3/2019  Patient name: Inderjit Cantrell  :   MRN: [id number]  Referring provider: Katherine Saleh MD  Dx:   Encounter Diagnosis     ICD-10-CM    1  Chronic tension-type headache, intractable G44 221 Ambulatory referral to Physical Therapy                  Assessment  Assessment details: Inderjit Cantrell is a 22 y o  female who presents with pain, decreased strength, decreased ROM, decreased joint mobility and postural  dysfunction  Due to these impairments, patient has difficulty performing ADL's, recreational activities, work-related activities, engaging in social activities  Patient's clinical presentation is consistent with their referring diagnosis of Chronic tension-type headache, intractable  Patient has been educated in home exercise program and plan of care  Patient would benefit from skilled physical therapy services to address their aforementioned functional limitations and progress towards prior level of function and independence with home exercise program      Impairments: abnormal or restricted ROM, activity intolerance, impaired physical strength, lacks appropriate home exercise program, pain with function, poor posture  and poor body mechanics  Barriers to therapy: High out of pocket cost through insurance       Goals  Short Term Goals to be accomplished in 4 weeks:  STG1: Pt will be I with HEP  STG2: Pt will be I with posture management  STG3: Pt will demo Cervical AROM >50% improvement  STG4: Pt will demo 1/2 MMT grade inc in cervical stabilizers and shoulder strength  STG5: Pt will report dec frequency/intensity/duration of HA by 25-50%  STG6: Pt will deny sleep disturbance due to pain     Long Term Goals to be accomplished in 12 weeks:   LTG1: Pt will demo cervical stab/shoulder strength to WNL as per PLOF  LTG2: Pt will return to PLOF pain-free  LTG3: Pt will demo cervical AROM WNL  LTG4: Pt will report frequency/intensity/duration of HA by 50-75% Plan  Plan details: HEP development, stretching, strengthening, A/AA/PROM, joint mobilizations, posture education, STM/MI as needed to reduce muscle tension, muscle reeducation, PLOC discussed and agreed upon with patient  Patient would benefit from: PT eval and skilled physical therapy  Planned modality interventions: cryotherapy and thermotherapy: hydrocollator packs  Planned therapy interventions: manual therapy, neuromuscular re-education, self care, therapeutic activities, therapeutic exercise and home exercise program  Frequency: 1x week  Plan of Care beginning date: 12/3/2019  Plan of Care expiration date: 2020  Treatment plan discussed with: patient        Subjective Evaluation    History of Present Illness  Mechanism of injury: Pt reports chronic HA over her entire life, which has progressively worsened over the past 2 weeks  Recently received treatment for Lyme disease in the last week and states HA dec and then worsened after  Pt states she does have stressors in her life due to job, applying to school, and other health conditions which increases her HA's  HA's are present daily, sometimes last weeks in nature  Advil helps minimally  Occassional wakes up with HA's  Denies nausea, dizziness, vomiting  Pain  Current pain ratin  At best pain ratin  At worst pain ratin  Location: neck, bilateral UT, posterior head   Quality: throbbing, dull ache, discomfort, sharp and pressure  Relieving factors: medications  Progression: worsening    Social Support    Employment status: working (vet tech)  Hand dominance: right      Diagnostic Tests  No diagnostic tests performed  Treatments  Previous treatment: medication and chiropractic  Patient Goals  Patient goals for therapy: decreased pain, return to sport/leisure activities and independence with ADLs/IADLs          Objective    Posture:  Forward head, rounded shoulders     Cervicial AROM limiation (* = pain)  Flexion: min loss  Extension: WNL  R rotation: min loss*  L rotation: WNL  R sidebend: min loss*  L sidebend: min loss*  Protraction: WNL  Retraction: min loss    Shoulder AROM: WNL    Strength: MMT R  L  Shoulder flexion 5/5  5/5  Shoulder abduction 5/5  5/5   Mid trap  3/5  3/5  Low trap  3/5  3/5      Flexibility:  (+) tightness R>L SO's  (+) tightness B UT  (+) tightness L>R lev scap  (+) tightness B pecs    Tenderness/Palpation:  (+) TTP w/ TrP SO's  (+) TTP w/ TrP B UT/lev scap    Joint mobility:  (+) Hypomobility noted upper cervical  (+) Hypermobility noted lower cervical          Precautions: Standard  Manual  12/3            SOR             Man tx             IASTM                                           Exercise Diary  12/3                         UT/LV stretch             Pec str             Retr/Flex             Scap retr                          Thoracic ext                                                                                                                                                                                          Modalities  12/3            MH 10'            estim                             Skin intact pre/post

## 2019-12-09 ENCOUNTER — OFFICE VISIT (OUTPATIENT)
Dept: PHYSICAL THERAPY | Facility: CLINIC | Age: 24
End: 2019-12-09
Payer: COMMERCIAL

## 2019-12-09 DIAGNOSIS — G44.221 CHRONIC TENSION-TYPE HEADACHE, INTRACTABLE: Primary | ICD-10-CM

## 2019-12-09 PROCEDURE — 97140 MANUAL THERAPY 1/> REGIONS: CPT

## 2019-12-09 NOTE — PROGRESS NOTES
Daily Note     Today's date: 2019  Patient name: Mg Williamson  :   MRN: [id number]  Referring provider: Bryant Quinones MD  Dx:   Encounter Diagnosis     ICD-10-CM    1  Chronic tension-type headache, intractable G44 221                   Subjective: Pt reports headache after last session that required use of advil  States less HA's over the past week since she found out her kidney function is normal  Current pain located L shoulder blade  Objective: See treatment diary below      Assessment: Tolerated treatment well  Patient demonstrated restricted upper cervical joint mobility with PA mobs, reproduced HA with L TP mobs  Symptoms were relieved post treatment  Patient demonstrated fatigue post treatment, exhibited good technique with therapeutic exercises and would benefit from continued PT  Plan: Continue per plan of care  Progress treatment as tolerated  Precautions: Standard  Manual  12/3 12/9           SOR/TrP  LEL           Man tx  LEL           IASTM  LEL                                         Exercise Diary  12/3 12/9                        UT/LV stretch  15"x3           Pec str  15"x3           Retr/Flex             Scap retr  20x5"                        Thoracic ext                                                                                                                                                                                          Modalities  12/3 12/9           MH 10' 10'           estim                             Skin intact pre/post

## 2019-12-10 ENCOUNTER — APPOINTMENT (OUTPATIENT)
Dept: PHYSICAL THERAPY | Facility: CLINIC | Age: 24
End: 2019-12-10
Payer: COMMERCIAL

## 2019-12-16 ENCOUNTER — OFFICE VISIT (OUTPATIENT)
Dept: PHYSICAL THERAPY | Facility: CLINIC | Age: 24
End: 2019-12-16
Payer: COMMERCIAL

## 2019-12-16 DIAGNOSIS — G44.221 CHRONIC TENSION-TYPE HEADACHE, INTRACTABLE: Primary | ICD-10-CM

## 2019-12-16 PROCEDURE — 97140 MANUAL THERAPY 1/> REGIONS: CPT

## 2019-12-16 NOTE — PROGRESS NOTES
Daily Note     Today's date: 2019  Patient name: Lul Vazquez  :   MRN: [id number]  Referring provider: Deandre Le MD  Dx:   Encounter Diagnosis     ICD-10-CM    1  Chronic tension-type headache, intractable G44 221                   Subjective: Pt reports headache located L temporal region which started when she woke up this morning due to sleeping "wrong " States she tracked her HA's last week and had 2 days of none which is an improvement  Objective: See treatment diary below      Assessment: Tolerated treatment well  HA was abolished post manual treatment  Patient demonstrated fatigue post treatment, exhibited good technique with therapeutic exercises and would benefit from continued PT  Plan: Continue per plan of care  Progress treatment as tolerated  Precautions: Standard  Manual  12/3 12/9 12/16          SOR/TrP  LEL LEL          Man tx  LEL LEL          IASTM  LEL LEL          Cupping   LEL                           Exercise Diary  12/3 12/9 12/16                       UT/LV stretch  15"x3 15"x3          Pec str  15"x3 15"x3          Retr/Flex             Scap retr  20x5"                        Thoracic ext             Sup nod   10x5"                                                                                                                                                                          Modalities  12/3 12/9 12/16          MH 10' 10' 10'          estim                             Skin intact pre/post MH

## 2019-12-23 ENCOUNTER — OFFICE VISIT (OUTPATIENT)
Dept: PHYSICAL THERAPY | Facility: CLINIC | Age: 24
End: 2019-12-23
Payer: COMMERCIAL

## 2019-12-23 DIAGNOSIS — G44.221 CHRONIC TENSION-TYPE HEADACHE, INTRACTABLE: Primary | ICD-10-CM

## 2019-12-23 PROCEDURE — 97140 MANUAL THERAPY 1/> REGIONS: CPT

## 2019-12-23 NOTE — PROGRESS NOTES
Daily Note     Today's date: 2019  Patient name: Kurtis Ingram  : [birth year]  MRN: [id number]  Referring provider: Ivan Burnette MD  Dx:   Encounter Diagnosis     ICD-10-CM    1  Chronic tension-type headache, intractable G44 221                   Subjective: Pt reports less HA but feels more shoulder and neck tension due to stressful days at work over the weekend  Objective: See treatment diary below      Assessment: Tolerated treatment well  Responding well to cupping treatment  Patient demonstrated fatigue post treatment, exhibited good technique with therapeutic exercises and would benefit from continued PT  Plan: Continue per plan of care  Progress treatment as tolerated  Precautions: Standard  Manual  12/3 12/9 12/16 12/23         SOR/TrP  LEL LEL LEL         Man tx  LEL LEL LEL         IASTM  LEL LEL LEL         Cupping   LEL LEL                          Exercise Diary  12/3 12/9 12/16 12/23                      UT/LV stretch  15"x3 15"x3 15"x3         Pec str  15"x3 15"x3 15"x3         Retr/Flex             Scap retr  20x5"  20x5"                      Thoracic ext             Sup nod   10x5"                       T/S 1/2 FR   [place]/Pec str    1'ea                                                                                                                                               Modalities  12/3 12/9 12/16 12/23         MH 10' 10' 10' 10'         estim                             Skin intact pre/post MH

## 2019-12-30 ENCOUNTER — OFFICE VISIT (OUTPATIENT)
Dept: PHYSICAL THERAPY | Facility: CLINIC | Age: 24
End: 2019-12-30
Payer: COMMERCIAL

## 2019-12-30 DIAGNOSIS — G44.221 CHRONIC TENSION-TYPE HEADACHE, INTRACTABLE: Primary | ICD-10-CM

## 2019-12-30 PROCEDURE — 97140 MANUAL THERAPY 1/> REGIONS: CPT

## 2019-12-30 NOTE — PROGRESS NOTES
Daily Note     Today's date: 2019  Patient name: Flaco Quiñones  : [id number]  MRN: [id number]  Referring provider: Nell Vasquez MD  Dx:   Encounter Diagnosis     ICD-10-CM    1  Chronic tension-type headache, intractable G44 221                   Subjective: Pt reports she had 2 HA's since last session which has been good  Objective: See treatment diary below      Assessment: Tolerated treatment well  L>R sided suprascapular mm restrictions addressed with cupping with good tolerance  Patient demonstrated fatigue post treatment, exhibited good technique with therapeutic exercises and would benefit from continued PT  Plan: Continue per plan of care  Progress treatment as tolerated  Precautions: Standard  Manual  12/3 12/9 12/16 12/23 12/30        SOR/TrP  LEL LEL LEL         Man tx  LEL LEL LEL         IASTM  LEL LEL LEL LEL        Cupping   LEL LEL LEL                         Exercise Diary  12/3 12/9 12/16 12/23 12/30                     UT/LV stretch  15"x3 15"x3 15"x3 15"x3        Pec str  15"x3 15"x3 15"x3 15"x3        Retr/Flex             Scap retr  20x5"  20x5" 20x5"                     Thoracic ext             Sup nod   10x5"                       T/S 1/2 FR   [place]ugs/[place]/Pec str    1'ea 1'ea                                                                                                                                              Modalities  12/3 12/9 12/16 12/23 12/30        MH 10' 10' 10' 10' 10'        estim                             Skin intact pre/post MH

## 2020-01-06 ENCOUNTER — OFFICE VISIT (OUTPATIENT)
Dept: PHYSICAL THERAPY | Facility: CLINIC | Age: 25
End: 2020-01-06

## 2020-01-06 DIAGNOSIS — G44.221 CHRONIC TENSION-TYPE HEADACHE, INTRACTABLE: Primary | ICD-10-CM

## 2020-01-06 PROCEDURE — 97140 MANUAL THERAPY 1/> REGIONS: CPT

## 2020-01-06 NOTE — PROGRESS NOTES
Daily Note     Today's date: 2020  Patient name: Larry Orta  :   MRN: [id number]  Referring provider: Araseli Hurley MD  Dx:   Encounter Diagnosis     ICD-10-CM    1  Chronic tension-type headache, intractable G44 221                   Subjective: Pt reports she can tell stress induces her HA's, she had a busy week at work and noticed an increase  She does notice good improvement in tension and pain overall  Objective: See treatment diary below      Assessment: Tolerated treatment well  R sided UT muscle spasm and TrP noted, addressed with cupping with good tolerance and dec symptoms post   Patient demonstrated fatigue post treatment, exhibited good technique with therapeutic exercises and would benefit from continued PT  Plan: Continue per plan of care  Progress treatment as tolerated  Hold treatment for 1 month while patient is traveling  Precautions: Standard  Manual  12/3 12/9 12/16 12/23 12/30 1/6       SOR/TrP  LEL LEL LEL         Man tx  LEL LEL LEL         IASTM  LEL LEL LEL LEL LEL       Cupping   LEL LEL LEL LEL                        Exercise Diary  12/3 12/9 12/16 12/23 12/30 1/6                    UT/LV stretch  15"x3 15"x3 15"x3 15"x3 15"x3       Pec str  15"x3 15"x3 15"x3 15"x3 15"x3       Retr/Flex             Scap retr  20x5"  20x5" 20x5" 20x5"                    Thoracic ext             Sup nod   10x5"                       T/S 1/2 FR   [place]ugs/[place]/Pec str    1'ea 1'ea 1'ea                                                                                                                                             Modalities  12/3 12/9 12/16 12/23 12/30 1/6       MH 10' 10' 10' 10' 10' 10'       estim                             Skin intact pre/post MH

## 2020-01-27 ENCOUNTER — OFFICE VISIT (OUTPATIENT)
Dept: PHYSICAL THERAPY | Facility: CLINIC | Age: 25
End: 2020-01-27
Payer: COMMERCIAL

## 2020-01-27 DIAGNOSIS — G44.221 CHRONIC TENSION-TYPE HEADACHE, INTRACTABLE: Primary | ICD-10-CM

## 2020-01-27 PROCEDURE — 97140 MANUAL THERAPY 1/> REGIONS: CPT | Performed by: PHYSICAL THERAPIST

## 2020-01-27 NOTE — PROGRESS NOTES
Daily Note     Today's date: 2020  Patient name: Clay Ghosh  : [birth year]  MRN: [id number]  Referring provider: Elodia Steven MD  Dx:   Encounter Diagnosis     ICD-10-CM    1  Chronic tension-type headache, intractable G44 221                   Subjective: Pt reports that she feels better however a lot of her stressors have lessened  Objective: See treatment diary below      Assessment: Tolerated treatment well; initiated POC with MH to B UTs  She continues to demonstrate increased tone with R UT more compared to L UT which improved post IASTM and MT  She denies having pain throughout session  Encouraged pt to use heat and continue to stretch  Patient demonstrated fatigue post treatment and would benefit from continued PT      Plan: Continue per plan of care  Precautions: Standard  Manual  12/3 12/9 12/16 12/23 12/30 1/6 1/6 1/27     SOR/TrP  LEL LEL LEL    [place] PA glides t/s     Man tx  LEL LEL LEL         IASTM  LEL LEL LEL LEL LEL  SMF     Cupping   LEL LEL LEL LEL  SMF                      Exercise Diary  12/3 12/9 12/16 12/23 12/30 1/6 1/27                   UT/LV stretch  15"x3 15"x3 15"x3 15"x3 15"x3 15"x3      Pec str  15"x3 15"x3 15"x3 15"x3 15"x3 15"x3      Retr/Flex             Scap retr  20x5"  20x5" 20x5" 20x5" 20x5"                   Thoracic ext             Sup nod   10x5"                       T/S 1/2 FR   Hugs/[place]/Pec str    1'ea 1'ea 1'ea 1 ea                                                                                                                                            Modalities  12/3 12/9 12/16 12/23 12/30 1/6 1/27       10' 10' 10' 10' 10' 10' 10'      estim                              Addendum: 3/18/2020: Pt is currently being discharged due to feeling better

## 2020-05-01 ENCOUNTER — TELEPHONE (OUTPATIENT)
Dept: FAMILY MEDICINE CLINIC | Facility: CLINIC | Age: 25
End: 2020-05-01

## 2020-05-01 ENCOUNTER — TELEMEDICINE (OUTPATIENT)
Dept: FAMILY MEDICINE CLINIC | Facility: CLINIC | Age: 25
End: 2020-05-01
Payer: COMMERCIAL

## 2020-05-01 VITALS — WEIGHT: 140 LBS | HEIGHT: 61 IN | BODY MASS INDEX: 26.43 KG/M2
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
## 2020-05-01 DIAGNOSIS — S61.459A CAT BITE OF HAND, INITIAL ENCOUNTER: Primary | ICD-10-CM

## 2020-05-01 PROCEDURE — 99214 OFFICE O/P EST MOD 30 MIN: CPT | Performed by: FAMILY MEDICINE

## 2020-05-01 RX ORDER — AMOXICILLIN AND CLAVULANATE POTASSIUM 875; 125 MG/1; MG/1
1 TABLET, FILM COATED ORAL EVERY 12 HOURS SCHEDULED
Qty: 20 TABLET | Refills: 0 | Status: SHIPPED | OUTPATIENT
Start: 2020-05-01 | End: 2020-05-11

## 2020-06-24 ENCOUNTER — CLINICAL SUPPORT (OUTPATIENT)
Dept: FAMILY MEDICINE CLINIC | Facility: CLINIC | Age: 25
End: 2020-06-24
Payer: COMMERCIAL

## 2020-06-24 DIAGNOSIS — Z11.1 PPD SCREENING TEST: Primary | ICD-10-CM

## 2020-06-24 PROCEDURE — 99211 OFF/OP EST MAY X REQ PHY/QHP: CPT

## 2020-06-24 PROCEDURE — 86580 TB INTRADERMAL TEST: CPT

## 2020-06-25 PROBLEM — L70.9 ACNE: Status: ACTIVE | Noted: 2017-05-16

## 2020-06-25 PROBLEM — Z80.3 FAMILY HISTORY OF BREAST CANCER: Status: ACTIVE | Noted: 2020-05-16

## 2020-06-26 ENCOUNTER — OFFICE VISIT (OUTPATIENT)
Dept: FAMILY MEDICINE CLINIC | Facility: CLINIC | Age: 25
End: 2020-06-26
Payer: COMMERCIAL

## 2020-06-26 VITALS
TEMPERATURE: 98.5 F | OXYGEN SATURATION: 98 % | HEIGHT: 61 IN | DIASTOLIC BLOOD PRESSURE: 78 MMHG | HEART RATE: 90 BPM | WEIGHT: 139.5 LBS | SYSTOLIC BLOOD PRESSURE: 104 MMHG | BODY MASS INDEX: 26.34 KG/M2

## 2020-06-26 DIAGNOSIS — Z00.00 ANNUAL PHYSICAL EXAM: Primary | ICD-10-CM

## 2020-06-26 PROCEDURE — 99395 PREV VISIT EST AGE 18-39: CPT | Performed by: FAMILY MEDICINE

## 2020-07-01 ENCOUNTER — TELEMEDICINE (OUTPATIENT)
Dept: FAMILY MEDICINE CLINIC | Facility: CLINIC | Age: 25
End: 2020-07-01
Payer: COMMERCIAL

## 2020-07-01 VITALS — BODY MASS INDEX: 25.49 KG/M2 | TEMPERATURE: 98 F | HEIGHT: 61 IN | WEIGHT: 135 LBS

## 2020-07-01 DIAGNOSIS — Z20.828 EXPOSURE TO SARS-ASSOCIATED CORONAVIRUS: Primary | ICD-10-CM

## 2020-07-01 DIAGNOSIS — Z20.828 EXPOSURE TO SARS-ASSOCIATED CORONAVIRUS: ICD-10-CM

## 2020-07-01 PROCEDURE — 3008F BODY MASS INDEX DOCD: CPT | Performed by: FAMILY MEDICINE

## 2020-07-01 PROCEDURE — 1036F TOBACCO NON-USER: CPT | Performed by: FAMILY MEDICINE

## 2020-07-01 PROCEDURE — 99214 OFFICE O/P EST MOD 30 MIN: CPT | Performed by: FAMILY MEDICINE

## 2020-07-01 PROCEDURE — U0003 INFECTIOUS AGENT DETECTION BY NUCLEIC ACID (DNA OR RNA); SEVERE ACUTE RESPIRATORY SYNDROME CORONAVIRUS 2 (SARS-COV-2) (CORONAVIRUS DISEASE [COVID-19]), AMPLIFIED PROBE TECHNIQUE, MAKING USE OF HIGH THROUGHPUT TECHNOLOGIES AS DESCRIBED BY CMS-2020-01-R: HCPCS

## 2020-07-01 NOTE — PROGRESS NOTES
COVID-19 Virtual Visit     Assessment/Plan:    Problem List Items Addressed This Visit     None      Visit Diagnoses     Exposure to SARS-associated coronavirus    -  Primary    Relevant Orders    MISC COVID-19 TEST- Collected at [facility]        This virtual check-in was done via [employer] and patient was informed that this is a secure, HIPAA-compliant platform  She agrees to proceed     Disposition:      I referred Rachel Muhammad to one of our centralized sites for a COVID-19 swab  At this time she is asymptomatic  I am going to send her for testing but advised that she should not go to work until her test is back and left for work tells her otherwise  If she does go to work, even if her test does come back negative, she technically has 11 more days until she could possibly develops symptoms even if she tests negative now  She absolutely needs to wear mask at all times around people  I spent 20 minutes directly with the patient during this visit    Encounter provider Kristen Giordano MD    Provider located at [address] [place], [place] [id number]    Recent Visits  Date Type Provider Dept   06/26/20 Office Visit MD Arnaud Cordova   Showing recent visits within past 7 days and meeting all other requirements     Today's Visits  Date Type Provider Dept   07/01/20 MD Arnaud Villagran   Showing today's visits and meeting all other requirements     Future Appointments  No visits were found meeting these conditions  Showing future appointments within next 150 days and meeting all other requirements        Patient agrees to participate in a virtual check in via telephone or video visit instead of presenting to the office to address urgent/immediate medical needs  Patient is aware this is a billable service  After connecting through [employer], the patient was identified by name and date of birth   Lashae Laughlin was informed that this was a telemedicine visit and that the exam was being conducted confidentially over secure lines  My office door was closed  No one else was in the room  Alisia Landau acknowledged consent and understanding of privacy and security of the telemedicine visit  I informed the patient that I have reviewed her record in Epic and presented the opportunity for her to ask any questions regarding the visit today  The patient agreed to participate  Subjective  Alisia Landau is a 22 y o  female who is concerned about COVID-19  She reports no symptoms, requires screening  She has not experienced no symptoms, requires screening She has had contact with a person who is under investigation for or who is positive for COVID-19 within the last 14 days  She has not been hospitalized recently for fever and/or lower respiratory symptoms  Her colleague called her yesterday - she was in the parking lot - she asked her to come out to the parking lot but wear mask  This colleague was hanging out with friends Thursday 6/25/20 and they were smoking hookah together, definitely not wearing masks, sharing the same hookah  Apparently her colleagues friend has since gotten sick and both the friend and friend's grandmother tested positive  Now her colleague is "not feeling well" and got tested yesterday, awaiting results  The patient did work with her colleague all day Saturday, she was not always wearing a mask while she was with her  The patient is asymptomatic now but lives with her mother who is a cancer survivor, now she is extraordinarily concerned  The patient works in a vet's office as the lead       Past Medical History:   Diagnosis Date    Frequent headaches     Pityriasis rosea 3/24/2015    Pneumonia     7years old    Urinary tract infection        Past Surgical History:   Procedure Laterality Date    WISDOM TOOTH EXTRACTION      WISDOM TOOTH EXTRACTION  11/2014 Current Outpatient Medications   Medication Sig Dispense Refill    clindamycin-benzoyl peroxide (BENZACLIN) gel Apply topically 2 (two) times a day      Dapsone (ACZONE EX) Apply topically       No current facility-administered medications for this visit  No Known Allergies    Review of Systems    Video Exam    Vitals:    07/01/20 1520   Temp: 98 °F (36 7 °C)   TempSrc: Tympanic   Weight: 61 2 kg (135 lb)   Height: 5' 1" (1 549 m)         Physical Exam   Constitutional: She is oriented to person, place, and time  She appears well-developed and well-nourished  No distress  HENT:   Head: Normocephalic  Mouth/Throat: Oropharynx is clear and moist    Eyes: Conjunctivae are normal    Neck: Normal range of motion  Pulmonary/Chest: Effort normal  No respiratory distress  Neurological: She is alert and oriented to person, place, and time  Skin: Skin is warm and dry  She is not diaphoretic  Psychiatric: She has a normal mood and affect  VIRTUAL VISIT DISCLAIMER    Harvey Harris acknowledges that she has consented to an online visit or consultation  She understands that the online visit is based solely on information provided by her, and that, in the absence of a face-to-face physical evaluation by the physician, the diagnosis she receives is both limited and provisional in terms of accuracy and completeness  This is not intended to replace a full medical face-to-face evaluation by the physician  Harvey Harris understands and accepts these terms

## 2020-07-06 LAB — SARS-COV-2 RNA SPEC QL NAA+PROBE: NOT DETECTED

## 2020-12-09 ENCOUNTER — TELEMEDICINE (OUTPATIENT)
Dept: FAMILY MEDICINE CLINIC | Facility: CLINIC | Age: 25
End: 2020-12-09
Payer: COMMERCIAL

## 2020-12-09 VITALS — WEIGHT: 125 LBS | TEMPERATURE: 98.7 F | BODY MASS INDEX: 23.6 KG/M2 | HEIGHT: 61 IN

## 2020-12-09 DIAGNOSIS — Z20.822 CLOSE EXPOSURE TO COVID-19 VIRUS: Primary | ICD-10-CM

## 2020-12-09 PROCEDURE — 99213 OFFICE O/P EST LOW 20 MIN: CPT | Performed by: FAMILY MEDICINE

## 2020-12-09 PROCEDURE — 3008F BODY MASS INDEX DOCD: CPT | Performed by: FAMILY MEDICINE

## 2020-12-09 PROCEDURE — 1036F TOBACCO NON-USER: CPT | Performed by: FAMILY MEDICINE

## 2020-12-09 RX ORDER — NITROFURANTOIN 25; 75 MG/1; MG/1
CAPSULE ORAL
COMMUNITY
Start: 2020-10-30 | End: 2020-12-09 | Stop reason: ALTCHOICE

## 2021-04-09 DIAGNOSIS — Z23 ENCOUNTER FOR IMMUNIZATION: ICD-10-CM

## 2022-03-17 ENCOUNTER — OFFICE VISIT (OUTPATIENT)
Dept: URGENT CARE | Age: 27
End: 2022-03-17
Payer: COMMERCIAL

## 2022-03-17 VITALS — OXYGEN SATURATION: 99 % | RESPIRATION RATE: 18 BRPM | HEART RATE: 105 BPM | TEMPERATURE: 98 F

## 2022-03-17 DIAGNOSIS — J02.9 SORE THROAT: Primary | ICD-10-CM

## 2022-03-17 LAB — S PYO AG THROAT QL: POSITIVE

## 2022-03-17 PROCEDURE — 87880 STREP A ASSAY W/OPTIC: CPT | Performed by: STUDENT IN AN ORGANIZED HEALTH CARE EDUCATION/TRAINING PROGRAM

## 2022-03-17 PROCEDURE — G0382 LEV 3 HOSP TYPE B ED VISIT: HCPCS | Performed by: STUDENT IN AN ORGANIZED HEALTH CARE EDUCATION/TRAINING PROGRAM

## 2022-03-17 RX ORDER — AMOXICILLIN AND CLAVULANATE POTASSIUM 875; 125 MG/1; MG/1
1 TABLET, FILM COATED ORAL EVERY 12 HOURS SCHEDULED
Qty: 14 TABLET | Refills: 0 | Status: SHIPPED | OUTPATIENT
Start: 2022-03-17 | End: 2022-03-24

## 2022-03-17 NOTE — PROGRESS NOTES
[facility]        NAME: Shirley Araujo is a 23 y o  female  : 1995    MRN: [id number]  DATE: 2022  TIME: 1:26 PM    Assessment and Plan   Sore throat [J02 9]  1  Sore throat  POCT rapid strepA    amoxicillin-clavulanate (AUGMENTIN) 875-125 mg per tablet     1  Acute Streptococcal Pharyngitis  - rapid strep test in office is positive   abx  prescribed, complete as directed  - take Tylenol or Motrin as needed   - drink plenty of fluids   - advised warm salt water gargles and throat lozenges as needed   - follow up w/ PCP for re-check in 3-5 days  - if symptoms persist despite treatment, worsen, or any new symptoms present, should be seen in the ER       Patient Instructions       Follow up with PCP in 3-5 days  Proceed to  ER if symptoms worsen  Chief Complaint     Chief Complaint   Patient presents with    Sore Throat         History of Present Illness       Sore Throat   This is a new problem  The current episode started yesterday  The problem has been gradually worsening  The pain is worse on the right side  There has been no fever  The fever has been present for less than 1 day  The pain is at a severity of 3/10  Pertinent negatives include no abdominal pain, congestion, coughing, diarrhea, drooling, ear discharge, ear pain, headaches, hoarse voice, plugged ear sensation, neck pain, shortness of breath, stridor, swollen glands, trouble swallowing or vomiting  She has had no exposure to strep or mono  Review of Systems   Review of Systems   HENT: Positive for sore throat  Negative for congestion, drooling, ear discharge, ear pain, hoarse voice and trouble swallowing  Respiratory: Negative for cough, shortness of breath and stridor  Gastrointestinal: Negative for abdominal pain, diarrhea and vomiting  Musculoskeletal: Negative for neck pain  Neurological: Negative for headaches           Current Medications       Current Outpatient Medications:    amoxicillin-clavulanate (AUGMENTIN) 875-125 mg per tablet, Take 1 tablet by mouth every 12 (twelve) hours for 7 days, Disp: 14 tablet, Rfl: 0    clindamycin-benzoyl peroxide (BENZACLIN) gel, Apply topically 2 (two) times a day, Disp: , Rfl:     Dapsone (ACZONE EX), Apply topically, Disp: , Rfl:     Current Allergies     Allergies as of 03/17/2022    (No Known Allergies)            The following portions of the patient's history were reviewed and updated as appropriate: allergies, current medications, past family history, past medical history, past social history, past surgical history and problem list      Past Medical History:   Diagnosis Date    Frequent headaches     Pityriasis rosea 3/24/2015    Pneumonia     7years old   [facility] Urinary tract infection        Past Surgical History:   Procedure Laterality Date    WISDOM TOOTH EXTRACTION      WISDOM TOOTH EXTRACTION  11/2014       Family History   Problem Relation Age of Onset    Osteoporosis Mother     Cancer Mother     Breast cancer Mother     Arthritis Maternal Grandmother     Osteoporosis Maternal Grandmother     Breast cancer Paternal Grandmother     Urolithiasis Brother     Nephrolithiasis Maternal Grandfather     Nephrolithiasis Maternal Aunt          Medications have been verified  Objective   Pulse 105   Temp 98 °F (36 7 °C) (Temporal)   Resp 18   SpO2 99%   No LMP recorded  Physical Exam     Physical Exam  Constitutional:       General: She is not in acute distress  Appearance: Normal appearance  HENT:      Head: Normocephalic  Nose: No congestion or rhinorrhea  Mouth/Throat:      Mouth: Mucous membranes are moist       Pharynx: Oropharyngeal exudate and posterior oropharyngeal erythema present  Eyes:      General:         Right eye: No discharge  Left eye: No discharge  Conjunctiva/sclera: Conjunctivae normal    Cardiovascular:      Rate and Rhythm: Normal rate and regular rhythm        Pulses: Normal pulses  Pulmonary:      Effort: Pulmonary effort is normal  No respiratory distress  Abdominal:      General: Abdomen is flat  There is no distension  Palpations: Abdomen is soft  Tenderness: There is no abdominal tenderness  Musculoskeletal:      Cervical back: Neck supple  Lymphadenopathy:      Cervical: Cervical adenopathy present  Skin:     General: Skin is warm  Capillary Refill: Capillary refill takes less than 2 seconds  Neurological:      Mental Status: She is alert and oriented to person, place, and time

## 2022-07-01 ENCOUNTER — OFFICE VISIT (OUTPATIENT)
Dept: URGENT CARE | Facility: CLINIC | Age: 27
End: 2022-07-01
Payer: COMMERCIAL

## 2022-07-01 ENCOUNTER — OFFICE VISIT (OUTPATIENT)
Dept: FAMILY MEDICINE CLINIC | Facility: CLINIC | Age: 27
End: 2022-07-01
Payer: COMMERCIAL

## 2022-07-01 VITALS
OXYGEN SATURATION: 98 % | RESPIRATION RATE: 18 BRPM | HEART RATE: 105 BPM | DIASTOLIC BLOOD PRESSURE: 97 MMHG | TEMPERATURE: 99 F | SYSTOLIC BLOOD PRESSURE: 135 MMHG | WEIGHT: 135 LBS | BODY MASS INDEX: 25.49 KG/M2 | HEIGHT: 61 IN

## 2022-07-01 VITALS
TEMPERATURE: 99 F | HEIGHT: 61 IN | DIASTOLIC BLOOD PRESSURE: 97 MMHG | WEIGHT: 135 LBS | SYSTOLIC BLOOD PRESSURE: 135 MMHG | OXYGEN SATURATION: 98 % | HEART RATE: 105 BPM | BODY MASS INDEX: 25.49 KG/M2

## 2022-07-01 DIAGNOSIS — J02.9 PHARYNGITIS, UNSPECIFIED ETIOLOGY: Primary | ICD-10-CM

## 2022-07-01 DIAGNOSIS — J02.9 PHARYNGITIS, UNSPECIFIED ETIOLOGY: ICD-10-CM

## 2022-07-01 DIAGNOSIS — Z20.822 ENCOUNTER FOR LABORATORY TESTING FOR COVID-19 VIRUS: Primary | ICD-10-CM

## 2022-07-01 LAB — S PYO AG THROAT QL: NEGATIVE

## 2022-07-01 PROCEDURE — 87880 STREP A ASSAY W/OPTIC: CPT

## 2022-07-01 PROCEDURE — 3725F SCREEN DEPRESSION PERFORMED: CPT | Performed by: FAMILY MEDICINE

## 2022-07-01 PROCEDURE — 87636 SARSCOV2 & INF A&B AMP PRB: CPT

## 2022-07-01 PROCEDURE — 99213 OFFICE O/P EST LOW 20 MIN: CPT | Performed by: FAMILY MEDICINE

## 2022-07-01 PROCEDURE — 87070 CULTURE OTHR SPECIMN AEROBIC: CPT

## 2022-07-01 PROCEDURE — G0382 LEV 3 HOSP TYPE B ED VISIT: HCPCS | Performed by: PHYSICIAN ASSISTANT

## 2022-07-01 RX ORDER — AMOXICILLIN 500 MG/1
500 CAPSULE ORAL EVERY 8 HOURS SCHEDULED
Qty: 30 CAPSULE | Refills: 0 | Status: SHIPPED | OUTPATIENT
Start: 2022-07-01 | End: 2022-07-11

## 2022-07-01 RX ORDER — NORETHINDRONE ACETATE AND ETHINYL ESTRADIOL 1; .02 MG/1; MG/1
TABLET ORAL DAILY
COMMUNITY

## 2022-07-01 NOTE — PROGRESS NOTES
Virtual Regular Visit    Verification of patient location:    Patient is located in the following state in which I hold an active license [place]      Assessment/Plan:    Problem List Items Addressed This Visit        Digestive    Pharyngitis - Primary     [facility]  Awaiting throat culture sent to lab  Added monospot to blood work  Continue antibiotic until results return           Relevant Orders    Mononucleosis screen    CBC and differential    Lyme Antibody Profile with reflex to WB          BMI Counseling: Body mass index is 25 51 kg/m²  The BMI is above normal  Nutrition recommendations include decreasing portion sizes  Exercise recommendations include exercising 3-5 times per week  No pharmacotherapy was ordered  Rationale for BMI follow-up plan is due to patient being overweight or obese  Depression Screening and Follow-up Plan: Patient was screened for depression during today's encounter  They screened negative with a PHQ-2 score of 0  Reason for visit is   Chief Complaint   Patient presents with    Virtual Regular Visit     virtural visit need a test for monor bloodwork went to urgent care this morning  and noticed white dots on the back of her throat,   [phone]    Virtual Regular Visit        Encounter provider Lin Norwood DO    Provider located at [address] [place] [id number]      Recent Visits  Date Type Provider Dept   07/01/22 Office Visit DO Arnaud Sr   Showing recent visits within past 7 days and meeting all other requirements  Future Appointments  No visits were found meeting these conditions  Showing future appointments within next 150 days and meeting all other requirements       The patient was identified by name and date of birth   Dyan Avery was informed that this is a telemedicine visit and that the visit is being conducted through [address] [facility] and patient was informed that this is a secure, HIPAA-compliant platform  She agrees to proceed     My office door was closed  No one else was in the room  She acknowledged consent and understanding of privacy and security of the video platform  The patient has agreed to participate and understands they can discontinue the visit at any time  Patient is aware this is a billable service  Subjective  Jorge Joe is a 23 y o  female complains of a sore throat   Pt complains of a sore throat   Seen at [employer]  Pt saw white dots on her tonsils  Rapid strep negative but also sent throat culture  Started on antibiotics but no improvement yet  Yesterday had a headache- persisted with headache after taking tylenol  achey joints and had a fever last night  Woke up this am without a fever  White patches in her throat  Had recent strep in march  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  Increased stress        Past Medical History:   Diagnosis Date    Frequent headaches     Pityriasis rosea 3/24/2015    Pneumonia     7years old    Urinary tract infection        Past Surgical History:   Procedure Laterality Date    WISDOM TOOTH EXTRACTION      WISDOM TOOTH EXTRACTION  11/2014       Current Outpatient Medications   Medication Sig Dispense Refill    amoxicillin (AMOXIL) 500 mg capsule Take 1 capsule (500 mg total) by mouth every 8 (eight) hours for 10 days 30 capsule 0    norethindrone-ethinyl estradiol (MICROGESTIN 1/20) 1-20 MG-MCG per tablet Daily       No current facility-administered medications for this visit  No Known Allergies    Review of Systems   Constitutional: Positive for fatigue and fever  HENT: Positive for sore throat  Eyes: Negative  Respiratory: Negative  Negative for cough  Cardiovascular: Negative  Gastrointestinal: Negative  Endocrine: Negative  Genitourinary: Negative  Musculoskeletal: Negative  Skin: Negative  Allergic/Immunologic: Negative      Neurological: Positive for headaches  Psychiatric/Behavioral: Negative  Video Exam    Vitals:    07/01/22 1313   BP: 135/97   Pulse: 105   Temp: 99 °F (37 2 °C)   SpO2: 98%   Weight: 61 2 kg (135 lb)   Height: 5' 1" (1 549 m)       Physical Exam  Vitals and nursing note reviewed  Constitutional:       Appearance: Normal appearance  She is well-developed  HENT:      Head: Normocephalic and atraumatic  Eyes:      Conjunctiva/sclera: Conjunctivae normal    Pulmonary:      Effort: Pulmonary effort is normal    Neurological:      Mental Status: She is alert and oriented to person, place, and time  Psychiatric:         Behavior: Behavior normal          Thought Content: Thought content normal          Judgment: Judgment normal           I spent 15 minutes directly with the patient during this visit    VIRTUAL VISIT DISCLAIMER      Lashae Laughlin verbally agrees to participate in [employer]  Pt is aware that [employer] could be limited without vital signs or the ability to perform a full hands-on physical Neeraj Gums understands she or the provider may request at any time to terminate the video visit and request the patient to seek care or treatment in person

## 2022-07-01 NOTE — PROGRESS NOTES
[facility]        NAME: Kamlesh Green is a 23 y o  female  : 1995    MRN: [id number]  DATE: 2022  TIME: 9:20 AM    /97   Pulse 105   Temp 99 °F (37 2 °C)   Resp 18   Ht 5' 1" (1 549 m)   Wt 61 2 kg (135 lb)   SpO2 98%   BMI 25 51 kg/m²     Assessment and Plan   Encounter for laboratory testing for COVID-19 virus [Z20 822]  1  Encounter for laboratory testing for COVID-19 virus  Cov/Flu-Collected at [facility]    amoxicillin (AMOXIL) 500 mg capsule   2  Pharyngitis, unspecified etiology  POCT rapid strepA    Throat culture    amoxicillin (AMOXIL) 500 mg capsule         Patient Instructions       Follow up with PCP in 3-5 days  Proceed to  ER if symptoms worsen  Chief Complaint     Chief Complaint   Patient presents with    Sore Throat     Pt reports headache, body aches for one day  Sore throat this morning   Fever         History of Present Illness       Pt with sore throat for several days       Review of Systems   Review of Systems   Constitutional: Negative  HENT: Positive for sore throat  Eyes: Negative  Respiratory: Negative  Cardiovascular: Negative  Gastrointestinal: Negative  Endocrine: Negative  Genitourinary: Negative  Musculoskeletal: Negative  Skin: Negative  Allergic/Immunologic: Negative  Neurological: Negative  Hematological: Negative  Psychiatric/Behavioral: Negative  All other systems reviewed and are negative          Current Medications       Current Outpatient Medications:     amoxicillin (AMOXIL) 500 mg capsule, Take 1 capsule (500 mg total) by mouth every 8 (eight) hours for 10 days, Disp: 30 capsule, Rfl: 0    clindamycin-benzoyl peroxide (BENZACLIN) gel, Apply topically 2 (two) times a day, Disp: , Rfl:     Dapsone (ACZONE EX), Apply topically, Disp: , Rfl:     Current Allergies     Allergies as of 2022    (No Known Allergies)            The following portions of the patient's history were reviewed and updated as appropriate: allergies, current medications, past family history, past medical history, past social history, past surgical history and problem list      Past Medical History:   Diagnosis Date    Frequent headaches     Pityriasis rosea 3/24/2015    Pneumonia     7years old    Urinary tract infection        Past Surgical History:   Procedure Laterality Date    WISDOM TOOTH EXTRACTION      WISDOM TOOTH EXTRACTION  11/2014       Family History   Problem Relation Age of Onset    Osteoporosis Mother     Cancer Mother     Breast cancer Mother     Arthritis Maternal Grandmother     Osteoporosis Maternal Grandmother     Breast cancer Paternal Grandmother     Urolithiasis Brother     Nephrolithiasis Maternal Grandfather     Nephrolithiasis Maternal Aunt          Medications have been verified  Objective   /97   Pulse 105   Temp 99 °F (37 2 °C)   Resp 18   Ht 5' 1" (1 549 m)   Wt 61 2 kg (135 lb)   SpO2 98%   BMI 25 51 kg/m²        Physical Exam     Physical Exam  Vitals and nursing note reviewed  Constitutional:       Appearance: Normal appearance  She is normal weight  Comments: Discussed with pt about possible mono testing with family doctor    HENT:      Head: Normocephalic and atraumatic  Right Ear: Tympanic membrane, ear canal and external ear normal       Left Ear: Tympanic membrane, ear canal and external ear normal       Nose: Nose normal       Mouth/Throat:      Mouth: Mucous membranes are moist       Pharynx: Oropharynx is clear  Posterior oropharyngeal erythema present  Eyes:      Extraocular Movements: Extraocular movements intact  Conjunctiva/sclera: Conjunctivae normal       Pupils: Pupils are equal, round, and reactive to light  Cardiovascular:      Rate and Rhythm: Normal rate and regular rhythm  Pulses: Normal pulses  Heart sounds: Normal heart sounds     Pulmonary:      Effort: Pulmonary effort is normal       Breath sounds: Normal breath sounds  Abdominal:      General: Abdomen is flat  Bowel sounds are normal       Palpations: Abdomen is soft  Musculoskeletal:         General: Normal range of motion  Cervical back: Normal range of motion and neck supple  Lymphadenopathy:      Cervical: Cervical adenopathy present  Skin:     General: Skin is warm  Capillary Refill: Capillary refill takes less than 2 seconds  Neurological:      General: No focal deficit present  Mental Status: She is alert and oriented to person, place, and time     Psychiatric:         Mood and Affect: Mood normal          Behavior: Behavior normal

## 2022-07-01 NOTE — PATIENT INSTRUCTIONS

## 2022-07-03 LAB
BACTERIA THROAT CULT: NORMAL
FLUAV RNA RESP QL NAA+PROBE: NEGATIVE
FLUBV RNA RESP QL NAA+PROBE: NEGATIVE
SARS-COV-2 RNA RESP QL NAA+PROBE: NEGATIVE

## 2022-07-04 PROBLEM — J02.9 PHARYNGITIS: Status: ACTIVE | Noted: 2022-07-04

## 2022-07-05 LAB
B BURGDOR AB SER IA-ACNC: <0.9 INDEX
BASOPHILS # BLD AUTO: 22 CELLS/UL (ref 0–200)
BASOPHILS NFR BLD AUTO: 0.3 %
EOSINOPHIL # BLD AUTO: 7 CELLS/UL (ref 15–500)
EOSINOPHIL NFR BLD AUTO: 0.1 %
ERYTHROCYTE [DISTWIDTH] IN BLOOD BY AUTOMATED COUNT: 12.6 % (ref 11–15)
HCT VFR BLD AUTO: 43 % (ref 35–45)
HETEROPH AB SER QL LA: NEGATIVE
HGB BLD-MCNC: 14.3 G/DL (ref 11.7–15.5)
LYMPHOCYTES # BLD AUTO: 1234 CELLS/UL (ref 850–3900)
LYMPHOCYTES NFR BLD AUTO: 16.9 %
MCH RBC QN AUTO: 27.8 PG (ref 27–33)
MCHC RBC AUTO-ENTMCNC: 33.3 G/DL (ref 32–36)
MCV RBC AUTO: 83.7 FL (ref 80–100)
MONOCYTES # BLD AUTO: 934 CELLS/UL (ref 200–950)
MONOCYTES NFR BLD AUTO: 12.8 %
NEUTROPHILS # BLD AUTO: 5103 CELLS/UL (ref 1500–7800)
NEUTROPHILS NFR BLD AUTO: 69.9 %
PLATELET # BLD AUTO: 270 THOUSAND/UL (ref 140–400)
PMV BLD REES-ECKER: 8.4 FL (ref 7.5–12.5)
RBC # BLD AUTO: 5.14 MILLION/UL (ref 3.8–5.1)
WBC # BLD AUTO: 7.3 THOUSAND/UL (ref 3.8–10.8)

## 2022-07-05 NOTE — ASSESSMENT & PLAN NOTE
Rapid strep at [facility] negative  Awaiting throat culture sent to lab  Added monospot to blood work   Continue antibiotic until results return

## 2022-07-06 ENCOUNTER — TELEPHONE (OUTPATIENT)
Dept: FAMILY MEDICINE CLINIC | Facility: CLINIC | Age: 27
End: 2022-07-06

## 2022-07-06 NOTE — TELEPHONE ENCOUNTER
----- Message from Aisha Knox DO sent at 7/6/2022  6:33 AM EDT -----  Your mono test is negative  Your lyme test is negative and your blood count is normal   How are you feeling?

## 2023-11-20 LAB
EXTERNAL CHLAMYDIA RESULT: NEGATIVE
N GONORRHOEA RRNA SPEC QL PROBE: NEGATIVE

## 2024-06-18 ENCOUNTER — OFFICE VISIT (OUTPATIENT)
Dept: FAMILY MEDICINE CLINIC | Facility: CLINIC | Age: 29
End: 2024-06-18
Payer: COMMERCIAL

## 2024-06-18 VITALS
HEART RATE: 89 BPM | SYSTOLIC BLOOD PRESSURE: 110 MMHG | TEMPERATURE: 98.9 F | OXYGEN SATURATION: 97 % | BODY MASS INDEX: 23.98 KG/M2 | DIASTOLIC BLOOD PRESSURE: 74 MMHG | HEIGHT: 61 IN | WEIGHT: 127 LBS

## 2024-06-18 DIAGNOSIS — J02.9 PHARYNGITIS, UNSPECIFIED ETIOLOGY: Primary | ICD-10-CM

## 2024-06-18 PROCEDURE — 99213 OFFICE O/P EST LOW 20 MIN: CPT | Performed by: FAMILY MEDICINE

## 2024-06-18 RX ORDER — AMOXICILLIN 875 MG/1
875 TABLET, COATED ORAL 2 TIMES DAILY
Qty: 14 TABLET | Refills: 0 | Status: SHIPPED | OUTPATIENT
Start: 2024-06-18 | End: 2024-06-25

## 2024-06-18 NOTE — PROGRESS NOTES
Answers submitted by the patient for this visit:  Sore Throat Questionnaire (Submitted on 6/18/2024)  Chief Complaint: Sore throat  Chronicity: new  Onset: yesterday  Progression since onset: unchanged  Pain worse on: right  Fever: 101 - 101.9 F  Fever duration: less than 1 day  pain severity now: mild  Pain - numeric: 3/10  headaches: Yes  hoarse voice: Yes  Assessment/Plan:      1. Pharyngitis, unspecified etiology  Assessment & Plan:  Amoxicillin 1 tab twice a day  Ibuprofen as needed   Orders:  -     amoxicillin (AMOXIL) 875 mg tablet; Take 1 tablet (875 mg total) by mouth 2 (two) times a day for 7 days        Subjective:  Chief Complaint   Patient presents with    Cold Like Symptoms     Sick. Sx includes severe sore throat, fever and right earache. Began yesterday. Got it from her niece. Has taken Tylenol.     PAP- Had in November. Will send care gap.        Patient ID: Shirley Whitmore is a 28 y.o. female.    Complains of a sore throat = niece and sister in law with similar symptoms. Symptoms started yesterday  Also with Right ear pain. fever    Sore Throat   This is a new problem. The current episode started yesterday. The problem has been unchanged. The pain is worse on the right side. The maximum temperature recorded prior to her arrival was 101 - 101.9 F. The fever has been present for Less than 1 day. The pain is at a severity of 3/10. The pain is mild. Associated symptoms include ear pain, headaches and a hoarse voice. Pertinent negatives include no coughing.       Review of Systems   Constitutional:  Positive for fever. Negative for fatigue.   HENT:  Positive for ear pain, hoarse voice, sore throat and voice change.    Eyes: Negative.    Respiratory: Negative.  Negative for cough.    Cardiovascular: Negative.    Gastrointestinal: Negative.    Endocrine: Negative.    Genitourinary: Negative.    Musculoskeletal: Negative.    Skin: Negative.    Allergic/Immunologic: Negative.    Neurological:   "Positive for headaches.   Psychiatric/Behavioral: Negative.           The following portions of the patient's history were reviewed and updated as appropriate: allergies, current medications, past family history, past medical history, past social history, past surgical history and problem list.    Objective:  Vitals:    06/18/24 1554   BP: 110/74   BP Location: Left arm   Patient Position: Sitting   Cuff Size: Standard   Pulse: 89   Temp: 98.9 °F (37.2 °C)   TempSrc: Tympanic   SpO2: 97%   Weight: 57.6 kg (127 lb)   Height: 5' 1\" (1.549 m)      Physical Exam  Vitals and nursing note reviewed.   Constitutional:       Appearance: She is well-developed.   HENT:      Head: Normocephalic and atraumatic.      Left Ear: Tympanic membrane normal.      Ears:      Comments: Right tm with mild erythema      Nose: Congestion present.      Mouth/Throat:      Pharynx: Posterior oropharyngeal erythema present. No oropharyngeal exudate.   Cardiovascular:      Rate and Rhythm: Normal rate and regular rhythm.      Heart sounds: Normal heart sounds.   Pulmonary:      Effort: Pulmonary effort is normal.      Breath sounds: Normal breath sounds.   Abdominal:      General: Bowel sounds are normal.      Palpations: Abdomen is soft.   Lymphadenopathy:      Cervical: Cervical adenopathy present.   Skin:     General: Skin is warm and dry.   Neurological:      Mental Status: She is alert and oriented to person, place, and time.   Psychiatric:         Behavior: Behavior normal.         Thought Content: Thought content normal.         Judgment: Judgment normal.         "

## 2024-06-19 ENCOUNTER — TELEPHONE (OUTPATIENT)
Dept: ADMINISTRATIVE | Facility: OTHER | Age: 29
End: 2024-06-19

## 2024-06-19 NOTE — LETTER
Procedure Request Form: Cervical Cancer Screening We have this letter already, just need to fax us the Lab results tk u!   2nd Request!      Date Requested: 24  Patient: Shirley Whitmore  Patient : 1995   Referring Provider: Michaela Sibley, DO        Date of Procedure ____[id number]__________________________       The above patient has informed us that they have completed their   most recent Cervical Cancer Screening at your facility. Please complete   this form and attach all corresponding procedure reports/results.    Comments __________________________________________________________  ____________________________________________________________________  ____________________________________________________________________  ____________________________________________________________________    Facility Completing Procedure _________________________________________    Form Completed By (print name) _______________________________________      Signature __________________________________________________________      These reports are needed for  compliance.    Please fax this completed form and a copy of the procedure report to our office located at [address] [place] [address] as soon as possible to Fax [phone] mariana Knight: Phone [phone]    We thank you for your assistance in treating our mutual patient.

## 2024-06-19 NOTE — TELEPHONE ENCOUNTER
Upon review of the In Basket request and the patient's chart, initial outreach has been made via fax to facility. Please see Contacts section for details.     Thank you  Antonia Ruggiero

## 2024-06-19 NOTE — TELEPHONE ENCOUNTER
----- Message from Kathy LYN sent at 6/18/2024  5:03 PM EDT -----  Regarding: PAP  06/18/24 5:03 PM    Hello, our patient Shirley Whitmore has had Pap Smear (HPV) aka Cervical Cancer Screening completed/performed. Please assist in updating the patient chart by making an External outreach to Arleen Barth MD facility located in [place], [place]. The date of service is November 2023.    Thank you,  Kathy IVEY

## 2024-06-19 NOTE — LETTER
Procedure Request Form: Cervical Cancer Screening      Date Requested: 24  Patient: Shirley Whitmore  Patient : 1995   Referring Provider: Michaela Sibley, DO        Date of Procedure ______________________________       The above patient has informed us that they have completed their   most recent Cervical Cancer Screening at your facility. Please complete   this form and attach all corresponding procedure reports/results.    Comments __________________________________________________________  ____________________________________________________________________  ____________________________________________________________________  ____________________________________________________________________    Facility Completing Procedure _________________________________________    Form Completed By (print name) _______________________________________      Signature __________________________________________________________      These reports are needed for  compliance.    Please fax this completed form and a copy of the procedure report to our office located at [address] [place] [address] as soon as possible to Fax [phone] mariana Knight: Phone [phone]    We thank you for your assistance in treating our mutual patient.

## 2024-06-19 NOTE — LETTER
Procedure Request Form: Cervical Cancer Screening We have recv the letter but can u please fax the lab results! This is the only way we can update the Health Maint tk u!      Date Requested: 24  Patient: Shirley Whitmore  Patient : 1995   Referring Provider: Michaela Sibley, DO        Date of Procedure _____20-18-87_________________________       The above patient has informed us that they have completed their   most recent Cervical Cancer Screening at your facility. Please complete   this form and attach all corresponding procedure reports/results.    Comments __________________________________________________________  ____________________________________________________________________  ____________________________________________________________________  ____________________________________________________________________    Facility Completing Procedure _________________________________________    Form Completed By (print name) _______________________________________      Signature __________________________________________________________      These reports are needed for  compliance.    Please fax this completed form and a copy of the procedure report to our office located at [address] [place] [address] as soon as possible to Fax [phone] mariana Knight: Phone [phone]    We thank you for your assistance in treating our mutual patient.

## 2024-06-25 NOTE — TELEPHONE ENCOUNTER
As a follow-up, a second attempt has been made for outreach via fax to facility and telephone call to facility. Please see Contacts section for details.    Thank you  Antonia Ruggiero

## 2024-06-26 NOTE — TELEPHONE ENCOUNTER
Upon review of the In Basket request we were able to locate, review, and update the patient chart as requested for Pap Smear (HPV) aka Cervical Cancer Screening.    Any additional questions or concerns should be emailed to the Practice Liaisons via the appropriate education email address, please do not reply via In Basket.    Thank you  Antonia Ruggireo   [facility]

## 2024-06-26 NOTE — TELEPHONE ENCOUNTER
Upon review of the In Basket request we were able to locate, review, and update the patient chart as requested for Chlamydia.    Any additional questions or concerns should be emailed to the Practice Liaisons via the appropriate education email address, please do not reply via In Basket.    Thank you  Antonia Ruggiero   [facility]

## 2024-06-30 PROBLEM — R79.89 ELEVATED SERUM CREATININE: Status: RESOLVED | Noted: 2019-11-11 | Resolved: 2024-06-30

## 2025-01-30 ENCOUNTER — OFFICE VISIT (OUTPATIENT)
Dept: FAMILY MEDICINE CLINIC | Facility: CLINIC | Age: 30
End: 2025-01-30

## 2025-01-30 VITALS
BODY MASS INDEX: 24.7 KG/M2 | SYSTOLIC BLOOD PRESSURE: 110 MMHG | HEART RATE: 86 BPM | TEMPERATURE: 99.2 F | HEIGHT: 61 IN | DIASTOLIC BLOOD PRESSURE: 84 MMHG | WEIGHT: 130.8 LBS | OXYGEN SATURATION: 99 %

## 2025-01-30 DIAGNOSIS — F41.9 ANXIETY: Primary | ICD-10-CM

## 2025-01-30 PROCEDURE — 99204 OFFICE O/P NEW MOD 45 MIN: CPT | Performed by: FAMILY MEDICINE

## 2025-01-30 RX ORDER — NICOTINE 14MG/24HR
1 PATCH, TRANSDERMAL 24 HOURS TRANSDERMAL DAILY
COMMUNITY

## 2025-01-30 RX ORDER — DIPHENOXYLATE HYDROCHLORIDE AND ATROPINE SULFATE 2.5; .025 MG/1; MG/1
1 TABLET ORAL DAILY
COMMUNITY

## 2025-01-30 RX ORDER — MULTIVIT WITH MINERALS/LUTEIN
1000 TABLET ORAL DAILY
COMMUNITY

## 2025-01-30 NOTE — PROGRESS NOTES
Assessment & Plan  Anxiety  New diagnosis, long standing symptoms  Treatment options reviewed along with risks and benefits and side effects   Start Zoloft 25 mg x 8 days, then 50 mg   To start therapy through work  Follow up in 6 weeks or so   Orders:  •  sertraline (ZOLOFT) 50 mg tablet; 1/2 tablet daily x 8 days then daily         Return in about 6 weeks (around 3/13/2025) for mood check.    Subjective:   Shirley is a 29 y.o. female here today for a follow-up on her current medical conditions:    Patient Active Problem List   Diagnosis   • Chronic tension-type headache, intractable   • Acne   • Family history of breast cancer   • Pharyngitis         Patient Care Team:  Tatiana Damon DO as PCP - General (Family Medicine)    Current Medications:  Current Outpatient Medications   Medication Sig Dispense Refill   • Ascorbic Acid (vitamin C) 1000 MG tablet Take 1,000 mg by mouth daily Pt takes 2 tablets daily     • MAGNESIUM PO Take 400 mg by mouth in the morning 2 capsules daily     • multivitamin (THERAGRAN) TABS Take 1 tablet by mouth daily     • Saccharomyces boulardii (Probiotic) 250 MG CAPS Take 1 capsule by mouth in the morning     • sertraline (ZOLOFT) 50 mg tablet 1/2 tablet daily x 8 days then daily 30 tablet 1     No current facility-administered medications for this visit.       HPI:  Chief Complaint   Patient presents with   • Anxiety     Increased anx since starting new job in July. Has been using coping mechanisms like exercising, would like to discuss starting medications.   • New Patient Visit     -- Above per clinical staff and reviewed. --    PHQ-2/9 Depression Screening    Little interest or pleasure in doing things: 0 - not at all  Feeling down, depressed, or hopeless: 0 - not at all  Trouble falling or staying asleep, or sleeping too much: 1 - several days  Feeling tired or having little energy: 1 - several days  Poor appetite or overeatin - several days  Feeling bad about yourself -  or that you are a failure or have let yourself or your family down: 0 - not at all  Trouble concentrating on things, such as reading the newspaper or watching television: 0 - not at all  Moving or speaking so slowly that other people could have noticed. Or the opposite - being so fidgety or restless that you have been moving around a lot more than usual: 0 - not at all  Thoughts that you would be better off dead, or of hurting yourself in some way: 0 - not at all  PHQ-2 Score: 0  PHQ-2 Interpretation: Negative depression screen  PHQ-9 Score: 3  PHQ-9 Interpretation: No or Minimal depression       JACKELIN-7 Flowsheet Screening    Flowsheet Row Most Recent Value   Over the last two weeks, how often have you been bothered by the following problems?     Feeling nervous, anxious, or on edge 2   Not being able to stop or control worrying 2   Worrying too much about different things 2   Trouble relaxing  2   Being so restless that it's hard to sit still 0   Becoming easily annoyed or irritable  0   Feeling afraid as if something awful might happen 0   How difficult have these problems made it for you to do your work, take care of things at home, or get along with other people?  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[employer]   Graduated last year   Puts pressure on herself   Gets overwhelmed   Coping mechanisms   Work gave her therapy   Moved home, finished home, moved in, was , new job   Boxing at a gym and was very helpful   7 hours of sleep   Used to sleep well but wakes during the night sometimes  No family history of anxiety/depression   Exercises regularly   2 dogs and a cat   Thinking about kids around age of 31   IUD Kristine July 2024   Pit in her stomach all the time  Spirals, over thinks things   One time had panic attack   Sometimes overstimulated and has cried at work     The following portions of the  "patient's history were reviewed and updated as appropriate: allergies, current medications, past family history, past medical history, past social history, past surgical history and problem list.    Objective:  Vitals:  /84 (Patient Position: Sitting, Cuff Size: Standard)   Pulse 86   Temp 99.2 °F (37.3 °C) (Temporal)   Ht 5' 1\" (1.549 m)   Wt 59.3 kg (130 lb 12.8 oz)   SpO2 99%   BMI 24.71 kg/m²    Wt Readings from Last 3 Encounters:   01/30/25 59.3 kg (130 lb 12.8 oz)   06/18/24 57.6 kg (127 lb)   07/01/22 61.2 kg (135 lb)      BP Readings from Last 3 Encounters:   01/30/25 110/84   06/18/24 110/74   07/01/22 135/97        Review of Systems   She has no other concerns. No unexpected weight changes. No chest pain, SOB, or palpitations. No GERD. No changes in bowels or bladder. Not always sleeping well. + mood changes.     Physical Exam   Constitutional:  she appears well-developed and well-nourished.  HENT: Head: Normocephalic.   Neck: Neck supple.   Cardiovascular: Normal rate, regular rhythm and normal heart sounds.   Pulmonary/Chest: Effort normal and breath sounds normal. No wheezes, rales, or rhonchi.   Abdominal: Soft. Bowel sounds are normal. There is no tenderness. No hepatosplenomegaly.   Musculoskeletal: she exhibits no edema.   Lymphadenopathy: she has no cervical adenopathy.   Neurological: she is alert and oriented to person, place, and time.   Skin: Skin is warm and dry.   Psychiatric: she has a normal mood and affect. her behavior is normal. Thought content normal.        "

## 2025-02-21 DIAGNOSIS — F41.9 ANXIETY: ICD-10-CM

## 2025-03-14 ENCOUNTER — APPOINTMENT (OUTPATIENT)
Dept: LAB | Facility: AMBULARY SURGERY CENTER | Age: 30
End: 2025-03-14
Payer: COMMERCIAL

## 2025-03-14 ENCOUNTER — TELEMEDICINE (OUTPATIENT)
Dept: FAMILY MEDICINE CLINIC | Facility: CLINIC | Age: 30
End: 2025-03-14
Payer: COMMERCIAL

## 2025-03-14 DIAGNOSIS — Z13.1 SCREENING FOR DIABETES MELLITUS: ICD-10-CM

## 2025-03-14 DIAGNOSIS — F41.9 ANXIETY: Primary | ICD-10-CM

## 2025-03-14 DIAGNOSIS — Z13.220 LIPID SCREENING: ICD-10-CM

## 2025-03-14 DIAGNOSIS — R35.0 URINARY FREQUENCY: ICD-10-CM

## 2025-03-14 DIAGNOSIS — F41.9 ANXIETY: ICD-10-CM

## 2025-03-14 LAB
BACTERIA UR QL AUTO: ABNORMAL /HPF
BILIRUB UR QL STRIP: NEGATIVE
CLARITY UR: CLEAR
COLOR UR: ABNORMAL
GLUCOSE UR STRIP-MCNC: NEGATIVE MG/DL
HGB UR QL STRIP.AUTO: NEGATIVE
KETONES UR STRIP-MCNC: NEGATIVE MG/DL
LEUKOCYTE ESTERASE UR QL STRIP: NEGATIVE
MUCOUS THREADS UR QL AUTO: ABNORMAL
NITRITE UR QL STRIP: NEGATIVE
NON-SQ EPI CELLS URNS QL MICRO: ABNORMAL /HPF
PH UR STRIP.AUTO: 7 [PH]
PROT UR STRIP-MCNC: ABNORMAL MG/DL
RBC #/AREA URNS AUTO: ABNORMAL /HPF
SP GR UR STRIP.AUTO: 1.02 (ref 1–1.03)
UROBILINOGEN UR STRIP-ACNC: <2 MG/DL
WBC #/AREA URNS AUTO: ABNORMAL /HPF

## 2025-03-14 PROCEDURE — 87086 URINE CULTURE/COLONY COUNT: CPT

## 2025-03-14 PROCEDURE — 81001 URINALYSIS AUTO W/SCOPE: CPT

## 2025-03-14 PROCEDURE — 99214 OFFICE O/P EST MOD 30 MIN: CPT | Performed by: FAMILY MEDICINE

## 2025-03-14 NOTE — PROGRESS NOTES
Virtual Regular Visit    Assessment/Plan:     Assessment & Plan  Anxiety  Improving nicely on zoloft   Continue 50 mg daily   Switch to morning to see if this helps side effects (waking in night/early am, possible urinary frequency)   If side effects are not improving will switch medications.  Orders:  •  CBC and differential; Future  •  Comprehensive metabolic panel; Future  •  Lipid panel; Future  •  Urine culture; Future  •  Urinalysis with microscopic; Future    Urinary frequency  Check UA and culture.  Orders:  •  CBC and differential; Future  •  Comprehensive metabolic panel; Future  •  Lipid panel; Future  •  Urine culture; Future  •  Urinalysis with microscopic; Future    Lipid screening  Check labs prior to next visit  Orders:  •  CBC and differential; Future  •  Comprehensive metabolic panel; Future  •  Lipid panel; Future  •  Urine culture; Future  •  Urinalysis with microscopic; Future    Screening for diabetes mellitus  Check labs prior to next visit  Orders:  •  CBC and differential; Future  •  Comprehensive metabolic panel; Future  •  Lipid panel; Future  •  Urine culture; Future  •  Urinalysis with microscopic; Future       Patient due for a physical at any time.  Return to office in about 3 months for physical, fasting blood work prior.  Return in about 3 months (around 6/14/2025) for Annual physical.    Reason for visit is   Chief Complaint   Patient presents with   • Follow-up     Mood f/u. Would like to discuss some side effects she is having.      Encounter provider Tatiana Damon DO  Provider located at [facility]  [address]  [place] [address] [phone]    Recent Visits  No visits were found meeting these conditions.  Showing recent visits within past 7 days and meeting all other requirements  Today's Visits  Date Type Provider Dept   03/14/25 Telemedicine Tatiana Damon DO [facility]   Showing today's visits and meeting all  other requirements  Future Appointments  No visits were found meeting these conditions.  Showing future appointments within next 150 days and meeting all other requirements       The patient was identified by name and date of birth. Shirley Whitmore was informed that this is a telemedicine visit and that the visit is being conducted through the Epic Embedded platform. She agrees to proceed..  My office door was closed. No one else was in the room.  She acknowledged consent and understanding of privacy and security of the video platform. The patient has agreed to participate and understands they can discontinue the visit at any time.    Patient is currently located in the [facility]  Patient is currently located in a state in which I am licensed    Patient is aware this is a billable service.     Subjective  Sihrley Whitmore is a 29 y.o. female is being seen via Video Visit today due to the COVID-19 pandemic.  Chief Complaint   Patient presents with   • Follow-up     Mood f/u. Would like to discuss some side effects she is having.        Today's concerns are:      No more panic attacks at work   Side effects the first two weeks   Not spirling as much   Feeling more confidence in herself   Still worries but not as much   Main side effects was light headed feeling   In the past 2 weeks not sleeping as well - wakes and cannot go back to sleep. Not worried at that time. Cannot go back to sleep.     There were no vitals filed for this visit.  Wt Readings from Last 3 Encounters:   01/30/25 59.3 kg (130 lb 12.8 oz)   06/18/24 57.6 kg (127 lb)   07/01/22 61.2 kg (135 lb)     BP Readings from Last 3 Encounters:   01/30/25 110/84   06/18/24 110/74   07/01/22 135/97       PHQ-2/9 Depression Screening    Little interest or pleasure in doing things: 0 - not at all  Feeling down, depressed, or hopeless: 0 - not at all  Trouble falling or staying asleep, or sleeping too much: 3 - nearly every day  Feeling tired or  having little energy: 2 - more than half the days  Poor appetite or overeatin - not at all  Feeling bad about yourself - or that you are a failure or have let yourself or your family down: 0 - not at all  Trouble concentrating on things, such as reading the newspaper or watching television: 0 - not at all  Moving or speaking so slowly that other people could have noticed. Or the opposite - being so fidgety or restless that you have been moving around a lot more than usual: 0 - not at all  Thoughts that you would be better off dead, or of hurting yourself in some way: 0 - not at all  PHQ-2 Score: 0  PHQ-2 Interpretation: Negative depression screen  PHQ-9 Score: 5  PHQ-9 Interpretation: Mild depression       JACKELIN-7 Flowsheet Screening    Flowsheet Row Most Recent Value   Over the last two weeks, how often have you been bothered by the following problems?     Feeling nervous, anxious, or on edge 1   Not being able to stop or control worrying 1   Worrying too much about different things 1   Trouble relaxing  0   Being so restless that it's hard to sit still 0   Becoming easily annoyed or irritable  0   Feeling afraid as if something awful might happen 0   How difficult have these problems made it for you to do your work, take care of things at home, or get along with other people?  Not difficult at all   JACKELIN Score  3         Past Medical History:   Diagnosis Date   • Frequent headaches    • Headache(784.0) years ago   • Pityriasis rosea 2015   • Pneumonia     9 years old   • Urinary tract infection      Past Surgical History:   Procedure Laterality Date   • WISDOM TOOTH EXTRACTION     • WISDOM TOOTH EXTRACTION  2014       Current Medications:  Current Outpatient Medications   Medication Sig Dispense Refill   • Ascorbic Acid (vitamin C) 1000 MG tablet Take 1,000 mg by mouth daily Pt takes 2 tablets daily     • MAGNESIUM PO Take 400 mg by mouth in the morning 2 capsules daily     • multivitamin (THERAGRAN)  TABS Take 1 tablet by mouth daily     • Saccharomyces boulardii (Probiotic) 250 MG CAPS Take 1 capsule by mouth in the morning     • sertraline (ZOLOFT) 50 mg tablet Take 1 tablet (50 mg total) by mouth daily 90 tablet 1     No current facility-administered medications for this visit.        Allergies:  No Known Allergies    Review of Systems  all others negative - no chest pain, SOB, + change in urine, normal bowels. no GERD. sleeping well. mood as above.     Physical Exam   Video Exam Pt not examined in person - seen over epic virtual video visit   Constitutional:  she appears well-developed and well-nourished.   HENT: Head: Normocephalic.   Right Ear: External ear normal.   Left Ear: External ear normal.   Nose: Nose normal.   Eyes: Pupils are equal, round, and reactive to light. Right eye exhibits no discharge. Left eye exhibits no discharge. No scleral icterus.   Neck: Normal range of motion.   Pulmonary/Chest: Effort normal. No respiratory distress.   Neurological: she is alert and oriented to person, place, and time.   Skin: Skin is warm and dry on face - no rashes. Not pale. Not diaphoretic.   Psychiatric: she  has a normal mood and affect. she behavior is normal. Thought content normal.       As a result of this visit, I have not referred the patient for further respiratory evaluation.    VIRTUAL VISIT DISCLAIMER    Shirley Whitmore acknowledges that she has consented to an online visit or consultation. She understands that the online visit is based solely on information provided by her, and that, in the absence of a face-to-face physical evaluation by the physician, the diagnosis she receives is both limited and provisional in terms of accuracy and completeness. This is not intended to replace a full medical face-to-face evaluation by the physician. Shirley Whitmore understands and accepts these terms.

## 2025-03-15 LAB — BACTERIA UR CULT: NORMAL

## 2025-03-16 ENCOUNTER — RESULTS FOLLOW-UP (OUTPATIENT)
Dept: FAMILY MEDICINE CLINIC | Facility: CLINIC | Age: 30
End: 2025-03-16

## 2025-03-25 NOTE — TELEPHONE ENCOUNTER
I recommend she see her GYN, or refer her to one if she does not have one. Triage to be sure there are no lesions seen. Until she is seen she can try some coconut oil. Sometimes this is just dryness from hormonal changes throughout the month.

## 2025-03-27 ENCOUNTER — PATIENT MESSAGE (OUTPATIENT)
Dept: FAMILY MEDICINE CLINIC | Facility: CLINIC | Age: 30
End: 2025-03-27

## 2025-03-27 ENCOUNTER — NURSE TRIAGE (OUTPATIENT)
Age: 30
End: 2025-03-27

## 2025-03-27 NOTE — TELEPHONE ENCOUNTER
"Regarding: possible yeast infection  ----- Message from Danyell VILLA sent at 3/27/2025  4:06 PM EDT -----  Message in My Chart: \"I feel like I have a burning, itching sensation down in my vulva/vagina area. I will just be sitting doing nothing and I am very aware and uncomfortable down there. I don't really recall seeing any sort of abnormal discharge (I will pay more attention now), but is there a chance I could have a yeast infection or a BV infection since I don't have a UTI?\"    "

## 2025-03-27 NOTE — TELEPHONE ENCOUNTER
"Reason for Disposition  • [1] Symptoms of a yeast infection (i.e., itchy, white discharge, not bad smelling) AND [2] feels like prior vaginal yeast infections    Additional Information  • Negative: [1] Vaginal itching AND [2] not improved > 3 days following Care Advice    Answer Assessment - Initial Assessment Questions  1. SYMPTOM: \"What's the main symptom you're concerned about?\" (e.g., pain, itching, dryness)      Itching in vulva, white and yellow discharge   2. LOCATION: \"Where is the itching located?\" (e.g., inside/outside, left/right)      Vulva   3. ONSET: \"When did the itching start?\"      A week ago.   4. PAIN: \"Is there any pain?\" If Yes, ask: \"How bad is it?\" (Scale: 1-10; mild, moderate, severe)      No  5. ITCHING: \"Is there any itching?\" If Yes, ask: \"How bad is it?\" (Scale: 1-10; mild, moderate, severe)      3-4  6. CAUSE: \"What do you think is causing the discharge?\" \"Have you had the same problem before?\" \"What happened then?\"      Possible yeast infection.   7. OTHER SYMPTOMS: \"Do you have any other symptoms?\" (e.g., fever, itching, vaginal bleeding, pain with urination, injury to genital area, vaginal foreign body)      Itching, white and yellow vaginal discharge   8. PREGNANCY: \"Is there any chance you are pregnant?\" \"When was your last menstrual period?\"      No, negative pregnancy test    Protocols used: Vaginal Symptoms-Adult-AH    "

## 2025-03-27 NOTE — TELEPHONE ENCOUNTER
FOLLOW UP: ASAP    REASON FOR CONVERSATION: Vaginitis    SYMPTOMS: itching, white and yellow discharge.    OTHER: Please follow up if treatment has to be prescribed     DISPOSITION: Home Care

## 2025-04-02 NOTE — TELEPHONE ENCOUNTER
Called pt to verify if she followed up with a GYN or anyone else, she stated she went to an UC and is being treated and has been doing better. Pt expressed gratitude for following up and stated she does have a GYN.

## 2025-07-09 ENCOUNTER — APPOINTMENT (OUTPATIENT)
Dept: LAB | Facility: AMBULARY SURGERY CENTER | Age: 30
End: 2025-07-09
Payer: COMMERCIAL

## 2025-07-09 DIAGNOSIS — Z13.1 SCREENING FOR DIABETES MELLITUS: ICD-10-CM

## 2025-07-09 DIAGNOSIS — F41.9 ANXIETY: ICD-10-CM

## 2025-07-09 DIAGNOSIS — R35.0 URINARY FREQUENCY: ICD-10-CM

## 2025-07-09 DIAGNOSIS — Z13.220 LIPID SCREENING: ICD-10-CM

## 2025-07-09 LAB
ALBUMIN SERPL BCG-MCNC: 4.5 G/DL (ref 3.5–5)
ALP SERPL-CCNC: 46 U/L (ref 34–104)
ALT SERPL W P-5'-P-CCNC: 25 U/L (ref 7–52)
ANION GAP SERPL CALCULATED.3IONS-SCNC: 9 MMOL/L (ref 4–13)
AST SERPL W P-5'-P-CCNC: 20 U/L (ref 13–39)
BASOPHILS # BLD AUTO: 0.03 THOUSANDS/ÂΜL (ref 0–0.1)
BASOPHILS NFR BLD AUTO: 1 % (ref 0–1)
BILIRUB SERPL-MCNC: 0.52 MG/DL (ref 0.2–1)
BUN SERPL-MCNC: 15 MG/DL (ref 5–25)
CALCIUM SERPL-MCNC: 9.6 MG/DL (ref 8.4–10.2)
CHLORIDE SERPL-SCNC: 102 MMOL/L (ref 96–108)
CHOLEST SERPL-MCNC: 172 MG/DL (ref ?–200)
CO2 SERPL-SCNC: 28 MMOL/L (ref 21–32)
CREAT SERPL-MCNC: 0.62 MG/DL (ref 0.6–1.3)
EOSINOPHIL # BLD AUTO: 0.07 THOUSAND/ÂΜL (ref 0–0.61)
EOSINOPHIL NFR BLD AUTO: 1 % (ref 0–6)
ERYTHROCYTE [DISTWIDTH] IN BLOOD BY AUTOMATED COUNT: 12.4 % (ref 11.6–15.1)
GFR SERPL CREATININE-BSD FRML MDRD: 123 ML/MIN/1.73SQ M
GLUCOSE P FAST SERPL-MCNC: 89 MG/DL (ref 65–99)
HCT VFR BLD AUTO: 43.2 % (ref 34.8–46.1)
HDLC SERPL-MCNC: 73 MG/DL
HGB BLD-MCNC: 14.1 G/DL (ref 11.5–15.4)
IMM GRANULOCYTES # BLD AUTO: 0.01 THOUSAND/UL (ref 0–0.2)
IMM GRANULOCYTES NFR BLD AUTO: 0 % (ref 0–2)
LDLC SERPL CALC-MCNC: 89 MG/DL (ref 0–100)
LYMPHOCYTES # BLD AUTO: 1.93 THOUSANDS/ÂΜL (ref 0.6–4.47)
LYMPHOCYTES NFR BLD AUTO: 33 % (ref 14–44)
MCH RBC QN AUTO: 28.3 PG (ref 26.8–34.3)
MCHC RBC AUTO-ENTMCNC: 32.6 G/DL (ref 31.4–37.4)
MCV RBC AUTO: 87 FL (ref 82–98)
MONOCYTES # BLD AUTO: 0.39 THOUSAND/ÂΜL (ref 0.17–1.22)
MONOCYTES NFR BLD AUTO: 7 % (ref 4–12)
NEUTROPHILS # BLD AUTO: 3.43 THOUSANDS/ÂΜL (ref 1.85–7.62)
NEUTS SEG NFR BLD AUTO: 58 % (ref 43–75)
NONHDLC SERPL-MCNC: 99 MG/DL
NRBC BLD AUTO-RTO: 0 /100 WBCS
PLATELET # BLD AUTO: 339 THOUSANDS/UL (ref 149–390)
PMV BLD AUTO: 8.8 FL (ref 8.9–12.7)
POTASSIUM SERPL-SCNC: 4 MMOL/L (ref 3.5–5.3)
PROT SERPL-MCNC: 7.8 G/DL (ref 6.4–8.4)
RBC # BLD AUTO: 4.99 MILLION/UL (ref 3.81–5.12)
SODIUM SERPL-SCNC: 139 MMOL/L (ref 135–147)
TRIGL SERPL-MCNC: 48 MG/DL (ref ?–150)
WBC # BLD AUTO: 5.86 THOUSAND/UL (ref 4.31–10.16)

## 2025-07-09 PROCEDURE — 80061 LIPID PANEL: CPT

## 2025-07-09 PROCEDURE — 80053 COMPREHEN METABOLIC PANEL: CPT

## 2025-07-09 PROCEDURE — 36415 COLL VENOUS BLD VENIPUNCTURE: CPT

## 2025-07-09 PROCEDURE — 85025 COMPLETE CBC W/AUTO DIFF WBC: CPT

## 2025-07-11 ENCOUNTER — OFFICE VISIT (OUTPATIENT)
Dept: FAMILY MEDICINE CLINIC | Facility: CLINIC | Age: 30
End: 2025-07-11
Payer: COMMERCIAL

## 2025-07-11 VITALS
HEIGHT: 61 IN | SYSTOLIC BLOOD PRESSURE: 104 MMHG | HEART RATE: 79 BPM | DIASTOLIC BLOOD PRESSURE: 78 MMHG | TEMPERATURE: 98.6 F | OXYGEN SATURATION: 99 % | WEIGHT: 142.6 LBS | BODY MASS INDEX: 26.92 KG/M2

## 2025-07-11 DIAGNOSIS — Z80.3 FAMILY HISTORY OF BREAST CANCER: Primary | ICD-10-CM

## 2025-07-11 DIAGNOSIS — F41.9 ANXIETY: ICD-10-CM

## 2025-07-11 PROCEDURE — 99214 OFFICE O/P EST MOD 30 MIN: CPT | Performed by: FAMILY MEDICINE

## 2025-07-11 RX ORDER — SERTRALINE HYDROCHLORIDE 25 MG/1
25 TABLET, FILM COATED ORAL DAILY
Qty: 90 TABLET | Refills: 1 | Status: SHIPPED | OUTPATIENT
Start: 2025-07-11 | End: 2026-07-06

## 2025-07-11 NOTE — PROGRESS NOTES
Adult Annual Physical  Name: Shirley Whitmore      : 1995      MRN: [id number]  Encounter Provider: Tatiana Damon DO  Encounter Date: 2025   Encounter department: [facility]    :  Assessment & Plan  Anxiety  Not at goal   Increase zoloft 50 mg to 75 mg daily   Orders:    sertraline (ZOLOFT) 25 mg tablet; Take 1 tablet (25 mg total) by mouth daily    sertraline (ZOLOFT) 50 mg tablet; Take 1 tablet (50 mg total) by mouth daily 75 + 25 mg daily    Family history of breast cancer  Refer for genetic counseling   Orders:    Ambulatory Referral to Genetics; Future      Assessment & Plan        Preventive Screenings:  - Diabetes Screening: screening up-to-date  - Hepatitis C screening: screening up-to-date   - Cervical cancer screening: screening up-to-date   - Lung cancer screening: screening not indicated     Well adult exam  ·         Continue healthy diet   ·         Encourage exercise 4 times a week or more for minimum 30 minutes.   ·         Continue to see dentist, wear seatbelt.  ·         Health maintenance reviewed and up-to-date.  Knows she had to 3rd HPV. Will start prenatal vitamin when she gets IUD out.   Reviewed age appropriate health maintenance screenings and immunizations that are due, risks and benefits of these.   Health Maintenance   Topic Date Due    Annual Physical  Never done    HPV Vaccine (3 - 3-dose series) 02/15/2018    PT PLAN OF CARE  2020    Influenza Vaccine (1) 2025    COVID-19 Vaccine ( -  season) 10/11/2025 (Originally 2024)    HIV Screening  2035 (Originally 10/6/2011)    Hepatitis C Screening  2035 (Originally 1995)    Depression Screening  2026    Cervical Cancer Screening  2026    DTaP,Tdap,and Td Vaccines (9 - Td or Tdap) 2029    Zoster Vaccine (1 of 2) 10/06/2046    HIB Vaccine  Completed    IPV Vaccine  Completed    Meningococcal ACWY Vaccine  Completed    Meningococcal B  Vaccine  Aged Out    RSV Vaccine age 0-20 Months  Aged Out    Pneumococcal Vaccine: Pediatrics (0 to 5 Years) and At-Risk Patients (6 to 49 Years)  Aged Out    Hepatitis A Vaccine  Aged Out    Chlamydia Screening  Discontinued          Follow up:  Return in about 6 months (around 2026) for virtual visit: mood Maddie Watson is a 29 y.o. female who presents today for a physical.   Chief Complaint   Patient presents with    Physical Exam     Pt interested genetic testing for the BRCA gene     History of Present Illness   Adult Annual Physical:  Patient presents for annual physical.     Diet and Physical Activity:  - Diet/Nutrition: well balanced diet.  - Exercise: walking.    General Health:  - Sleep: 7-8 hours of sleep on average.  - Hearing: normal hearing bilateral ears.  - Vision: no vision problems.  - Dental: regular dental visits.    /GYN Health:  - Follows with GYN: yes.   - Menopause: premenopausal.   - Last menstrual cycle: 2025.   - History of STDs: no  - Contraception: IUD placement.      Advanced Care Planning:  - Has an advanced directive?: no    - Has a durable medical POA?: no            Concerns today:  Little more anxiety   Better than before the medication   Used to fixate on things   Pit in stomach     Considering having children           Review of Systems  ROS:  all others negative - no chest pain, SOB, normal urine and bowels. no GERD. sleeping well. mood as above.    PHQ-2/9 Depression Screening    Little interest or pleasure in doing things: 0 - not at all  Feeling down, depressed, or hopeless: 0 - not at all  Trouble falling or staying asleep, or sleeping too much: 0 - not at all  Feeling tired or having little energy: 1 - several days  Poor appetite or overeatin - not at all  Feeling bad about yourself - or that you are a failure or have let yourself or your family down: 0 - not at all  Trouble concentrating on things, such as reading the newspaper or watching television: 0  "- not at all  Moving or speaking so slowly that other people could have noticed. Or the opposite - being so fidgety or restless that you have been moving around a lot more than usual: 0 - not at all  Thoughts that you would be better off dead, or of hurting yourself in some way: 0 - not at all  PHQ-9 Score: 1  PHQ-9 Interpretation: No or Minimal depression              Objective   /78 (Patient Position: Sitting, Cuff Size: Standard)   Pulse 79   Temp 98.6 °F (37 °C) (Temporal)   Ht 5' 1\" (1.549 m)   Wt 64.7 kg (142 lb 9.6 oz)   LMP 06/25/2025   SpO2 99%   BMI 26.94 kg/m²     /78 (Patient Position: Sitting, Cuff Size: Standard)   Pulse 79   Temp 98.6 °F (37 °C) (Temporal)   Ht 5' 1\" (1.549 m)   Wt 64.7 kg (142 lb 9.6 oz)   LMP 06/25/2025   SpO2 99%   BMI 26.94 kg/m²     BP Readings from Last 3 Encounters:   07/11/25 104/78   01/30/25 110/84   06/18/24 110/74     Wt Readings from Last 3 Encounters:   07/11/25 64.7 kg (142 lb 9.6 oz)   01/30/25 59.3 kg (130 lb 12.8 oz)   06/18/24 57.6 kg (127 lb)       Physical Exam  Constitutional: she appears well-developed and well-nourished.   HENT: Head: Normocephalic.   Right Ear: External ear normal. Tympanic membrane normal.   Left Ear: External ear normal. Tympanic membrane normal.   Nose: Nose normal. No mucosal edema, No rhinorrhea.   Right sinus exhibits no maxillary sinus tenderness.   Left sinus exhibits no maxillary sinus tenderness.   Mouth/Throat: Oropharynx is clear and moist.   Eyes: Normal conjunctiva.  No erythema. No discharge.  Neck: No pain on exam. Neck supple.   Cardiovascular: Normal rate, regular rhythm and normal heart sounds.    Pulmonary/Chest: Effort normal and breath sounds normal. No wheezes. No rales. No rhonchi.   Abdominal: Soft. Bowel sounds are normal. There is no tenderness.   Musculoskeletal: she exhibits no edema.   Lymphadenopathy: she has no cervical adenopathy.   Neurological: she  is alert and oriented to person, " place, and time.   Skin: Skin is warm and dry. No rashes.  Psychiatric: she  has a normal mood and affect. her behavior is normal. Thought content normal.   Vitals reviewed.      Current Medications:  Current Medications[1]             [1]   Current Outpatient Medications   Medication Sig Dispense Refill    Ascorbic Acid (vitamin C) 1000 MG tablet Take 1,000 mg by mouth in the morning. Pt takes 2 tablets daily.      MAGNESIUM PO Take 400 mg by mouth in the morning 2 capsules daily      multivitamin (THERAGRAN) TABS Take 1 tablet by mouth in the morning.      Saccharomyces boulardii (Probiotic) 250 MG CAPS Take 1 capsule by mouth in the morning      sertraline (ZOLOFT) 25 mg tablet Take 1 tablet (25 mg total) by mouth daily 90 tablet 1    sertraline (ZOLOFT) 50 mg tablet Take 1 tablet (50 mg total) by mouth daily 75 + 25 mg daily 90 tablet 1     No current facility-administered medications for this visit.